# Patient Record
Sex: FEMALE | Race: WHITE | Employment: FULL TIME | URBAN - METROPOLITAN AREA
[De-identification: names, ages, dates, MRNs, and addresses within clinical notes are randomized per-mention and may not be internally consistent; named-entity substitution may affect disease eponyms.]

---
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
## 2021-02-11 ENCOUNTER — VIRTUAL VISIT (OUTPATIENT)
Dept: FAMILY MEDICINE CLINIC | Age: 21
End: 2021-02-11
Payer: COMMERCIAL

## 2021-02-11 DIAGNOSIS — K60.2 ANAL FISSURE: Primary | ICD-10-CM

## 2021-02-11 DIAGNOSIS — R10.31 RIGHT LOWER QUADRANT ABDOMINAL PAIN: ICD-10-CM

## 2021-02-11 PROCEDURE — 99422 OL DIG E/M SVC 11-20 MIN: CPT | Performed by: STUDENT IN AN ORGANIZED HEALTH CARE EDUCATION/TRAINING PROGRAM

## 2021-02-11 RX ORDER — DOCUSATE SODIUM 100 MG/1
100 CAPSULE, LIQUID FILLED ORAL 2 TIMES DAILY PRN
Qty: 60 CAPSULE | Refills: 0 | Status: SHIPPED | OUTPATIENT
Start: 2021-02-11 | End: 2021-03-12 | Stop reason: ALTCHOICE

## 2021-02-11 ASSESSMENT — ENCOUNTER SYMPTOMS
SINUS PRESSURE: 0
ANAL BLEEDING: 1
BLOOD IN STOOL: 0
NAUSEA: 1
SORE THROAT: 0
COUGH: 0
DIARRHEA: 0
ABDOMINAL PAIN: 0
SHORTNESS OF BREATH: 0
CONSTIPATION: 1
RECTAL PAIN: 1
VOMITING: 0

## 2021-02-11 ASSESSMENT — PATIENT HEALTH QUESTIONNAIRE - PHQ9
1. LITTLE INTEREST OR PLEASURE IN DOING THINGS: 0
SUM OF ALL RESPONSES TO PHQ QUESTIONS 1-9: 0

## 2021-02-11 NOTE — PATIENT INSTRUCTIONS
Patient Education     Anal Fissure: Care Instructions  Your Care Instructions  An anal fissure is a tear in the lining of the lower rectum (anus). It can itch and cause pain. You may notice bright red blood on toilet paper after you wipe. A fissure may form if you are constipated and try to pass a large, hard stool or if you do not relax your anal muscles during a bowel movement. Most anal fissures heal with home treatment after a few days or weeks. If you have an anal fissure that takes more time to heal, your doctor may prescribe medicine. In rare cases, surgery may be needed. Anal fissures do not lead to colon cancer or other serious illnesses. However, if you have blood mixed in with the stool, talk to your doctor. Follow-up care is a key part of your treatment and safety. Be sure to make and go to all appointments, and call your doctor if you are having problems. It's also a good idea to know your test results and keep a list of the medicines you take. How can you care for yourself at home? · If your doctor prescribed cream or ointment, use it exactly as prescribed. Call your doctor if you think you are having a problem with your medicine. You will get more details on the specific medicines your doctor prescribes. · Sit in a few inches of warm water (sitz bath) 3 times a day and after bowel movements. The warm water helps the area heal and eases discomfort. Do not put soaps, salts, or shampoos in the water. · Avoid constipation:  ? Include fruits, vegetables, beans, and whole grains in your diet each day. These foods are high in fiber. ? Drink plenty of fluids, enough so that your urine is light yellow or clear like water. If you have kidney, heart, or liver disease and have to limit fluids, talk with your doctor before you increase the amount of fluids you drink. ? Get some exercise every day. Build up slowly to 30 to 60 minutes a day on 5 or more days of the week. ? Take a fiber supplement, such as Benefiber, Citrucel, or Metamucil, every day if needed. Read and follow all instructions on the label. ? Use the toilet when you feel the urge. Or when you can, schedule time each day for a bowel movement. A daily routine may help. Take your time and do not strain when having a bowel movement. But do not sit on the toilet too long. · Support your feet with a small step stool when you sit on the toilet. This helps flex your hips and places your pelvis in a squatting position. · Your doctor may recommend an over-the-counter laxative, such as Miralax, Milk of Magnesia, or Ex-Lax. Read and follow all instructions on the label, and do not use these medicines on a long-term basis. · Do not use over-the-counter ointments or creams without talking to your doctor. Some of these preparations may not help. · Use baby wipes or medicated pads, such as Preparation H or Tucks, instead of toilet paper to clean after a bowel movement. These products do not irritate the anus. · Be safe with medicines. Read and follow all instructions on the label. ? If the doctor gave you a prescription medicine for pain, take it as prescribed. ? If you are not taking a prescription pain medicine, ask your doctor if you can take an over-the-counter medicine. When should you call for help? Call your doctor now or seek immediate medical care if:    · You have new or worse pain.     · You have new or worse bleeding from the rectum. Watch closely for changes in your health, and be sure to contact your doctor if:    · You have trouble passing stools.     · You do not get better as expected. Where can you learn more? Go to https://Piethis.comjeff.[email or web address] org and sign in to your [employer] account. Enter M425 in the [employer] box to learn more about \"Anal Fissure: Care Instructions. \"     If you do not have an account, please click on the \"Sign Up Now\" link.

## 2021-02-11 NOTE — PROGRESS NOTES
This visit began at 8:15am     Location of the visit was the [facility] primary care site. TELEHEALTH APPOINTMENT  Patient has been screened to determine that this visit qualifies for a \"Telephone Visit\". Patient is currently established with the current medical practice, the condition being reviewed was not addressed within the previous 7 days and is not likely be determined to need a procedure within the next 24 hours. This visit was via telephone due to the restrictions of the COVID-19 pandemic. All issues as below were discussed and addressed but no physical exam was performed. It was felt the patient should be evaluated in the clinic there will be comment below demonstrating they were directed there. The patient is aware and has given verbal consent to be billed for this telephone encounter.     Patient Name: Emmanuel Pena  MRN: [id number]    Chief Complaint   Patient presents with    Abdominal Pain     x2 days     Rectal Bleeding     HPI  Abdominal pain  Started 2 days ago  Similar to prior episodes  Pain located lower abdomen bilaterally  Constant  Nothing that seems to make it better or worse  Yesterday noticed blood in the stool and when she wiped  She has never had bloody stools before  She did have some pain with BM yesterday and today  Off and on constipation, no history of hemorrhoids  Blood was bight red in color  Endorses mild nausea, denied emesis  No specific foods that make her abdominal pain worse    Per records - abdominal pain is a chronic issue, has seen peds GI in the past  Started on prilosec 8/2020 with some improvement in symptoms at that time  Per Eliza's note, consider CT abd/pelvis if no improvement after 2 weeks of prilosec     PMH:  Current Outpatient Medications on File Prior to Visit   Medication Sig Dispense Refill    venlafaxine (EFFEXOR XR) 75 MG extended release capsule TAKE ONE CAPSULE BY MOUTH DAILY 30 capsule 5  drospirenone-ethinyl estradiol (NICHOL) 3-0.02 MG per tablet TAKE ONE TABLET BY MOUTH EVERY DAY 28 tablet 11    omeprazole (PRILOSEC) 20 MG delayed release capsule Take 1 capsule by mouth every morning (before breakfast) 30 capsule 1    ondansetron (ZOFRAN-ODT) 4 MG disintegrating tablet Take 1 tablet by mouth 3 times daily as needed for Nausea or Vomiting 21 tablet 0     No current facility-administered medications on file prior to visit.       Past Medical History:   Diagnosis Date    Anxiety     Gastritis      Past Surgical History:   Procedure Laterality Date    UPPER GASTROINTESTINAL ENDOSCOPY  2018     Social History     Socioeconomic History    Marital status: Single     Spouse name: Not on file    Number of children: Not on file    Years of education: Not on file    Highest education level: Not on file   Occupational History    Not on file   Social Needs    Financial resource strain: Not hard at all    Food insecurity     Worry: Never true     Inability: Never true   [employer] needs     Medical: No     Non-medical: No   Tobacco Use    Smoking status: Never Smoker    Smokeless tobacco: Never Used   Substance and Sexual Activity    Alcohol use: No    Drug use: No    Sexual activity: Never   Lifestyle    Physical activity     Days per week: Not on file     Minutes per session: Not on file    Stress: Not on file   Relationships    Social connections     Talks on phone: Not on file     Gets together: Not on file     Attends Shinto service: Not on file     Active member of club or organization: Not on file     Attends meetings of clubs or organizations: Not on file     Relationship status: Not on file    Intimate partner violence     Fear of current or ex partner: Not on file     Emotionally abused: Not on file     Physically abused: Not on file     Forced sexual activity: Not on file   Other Topics Concern    Not on file   Social History Narrative    Not on file An anal fissure is a tear in the lining of the lower rectum (anus). It can itch and cause pain. You may notice bright red blood on toilet paper after you wipe. A fissure may form if you are constipated and try to pass a large, hard stool or if you do not relax your anal muscles during a bowel movement. Most anal fissures heal with home treatment after a few days or weeks. If you have an anal fissure that takes more time to heal, your doctor may prescribe medicine. In rare cases, surgery may be needed. Anal fissures do not lead to colon cancer or other serious illnesses. However, if you have blood mixed in with the stool, talk to your doctor. Follow-up care is a key part of your treatment and safety. Be sure to make and go to all appointments, and call your doctor if you are having problems. It's also a good idea to know your test results and keep a list of the medicines you take. How can you care for yourself at home? · If your doctor prescribed cream or ointment, use it exactly as prescribed. Call your doctor if you think you are having a problem with your medicine. You will get more details on the specific medicines your doctor prescribes. · Sit in a few inches of warm water (sitz bath) 3 times a day and after bowel movements. The warm water helps the area heal and eases discomfort. Do not put soaps, salts, or shampoos in the water. · Avoid constipation:  ? Include fruits, vegetables, beans, and whole grains in your diet each day. These foods are high in fiber. ? Drink plenty of fluids, enough so that your urine is light yellow or clear like water. If you have kidney, heart, or liver disease and have to limit fluids, talk with your doctor before you increase the amount of fluids you drink. ? Get some exercise every day. Build up slowly to 30 to 60 minutes a day on 5 or more days of the week. ? Take a fiber supplement, such as Benefiber, Citrucel, or Metamucil, every day if needed. Read and follow all instructions on the label. ? Use the toilet when you feel the urge. Or when you can, schedule time each day for a bowel movement. A daily routine may help. Take your time and do not strain when having a bowel movement. But do not sit on the toilet too long. · Support your feet with a small step stool when you sit on the toilet. This helps flex your hips and places your pelvis in a squatting position. · Your doctor may recommend an over-the-counter laxative, such as Miralax, Milk of Magnesia, or Ex-Lax. Read and follow all instructions on the label, and do not use these medicines on a long-term basis. · Do not use over-the-counter ointments or creams without talking to your doctor. Some of these preparations may not help. · Use baby wipes or medicated pads, such as Preparation H or Tucks, instead of toilet paper to clean after a bowel movement. These products do not irritate the anus. · Be safe with medicines. Read and follow all instructions on the label. ? If the doctor gave you a prescription medicine for pain, take it as prescribed. ? If you are not taking a prescription pain medicine, ask your doctor if you can take an over-the-counter medicine. When should you call for help? Call your doctor now or seek immediate medical care if:    · You have new or worse pain.     · You have new or worse bleeding from the rectum. Watch closely for changes in your health, and be sure to contact your doctor if:    · You have trouble passing stools.     · You do not get better as expected. Where can you learn more? Go to [email or web address]://Koubachijeff.[email or web address]. org and sign in to your [employer] account. Enter V441 in the [employer] box to learn more about \"Anal Fissure: Care Instructions. \"     If you do not have an account, please click on the \"Sign Up Now\" link. Current as of: April 15, 2020               Content Version: 12.6  © 7614-7866 [email or web address]. Care instructions adapted under license by [facility] [place] ([facility]). If you have questions about a medical condition or this instruction, always ask your healthcare professional. [address] any warranty or liability for your use of this information.              This visit ended at 8:26am    Kate Dalal, DO

## 2021-03-12 ENCOUNTER — OFFICE VISIT (OUTPATIENT)
Dept: FAMILY MEDICINE CLINIC | Age: 21
End: 2021-03-12
Payer: COMMERCIAL

## 2021-03-12 VITALS
DIASTOLIC BLOOD PRESSURE: 82 MMHG | HEART RATE: 83 BPM | HEIGHT: 64 IN | WEIGHT: 130.6 LBS | OXYGEN SATURATION: 99 % | BODY MASS INDEX: 22.3 KG/M2 | TEMPERATURE: 98.1 F | SYSTOLIC BLOOD PRESSURE: 116 MMHG

## 2021-03-12 DIAGNOSIS — F51.05 INSOMNIA DUE TO OTHER MENTAL DISORDER: ICD-10-CM

## 2021-03-12 DIAGNOSIS — F43.21 GRIEF REACTION: ICD-10-CM

## 2021-03-12 DIAGNOSIS — F99 INSOMNIA DUE TO OTHER MENTAL DISORDER: ICD-10-CM

## 2021-03-12 DIAGNOSIS — F41.1 GENERALIZED ANXIETY DISORDER: Primary | ICD-10-CM

## 2021-03-12 PROCEDURE — G8420 CALC BMI NORM PARAMETERS: HCPCS | Performed by: NURSE PRACTITIONER

## 2021-03-12 PROCEDURE — G8427 DOCREV CUR MEDS BY ELIG CLIN: HCPCS | Performed by: NURSE PRACTITIONER

## 2021-03-12 PROCEDURE — 99214 OFFICE O/P EST MOD 30 MIN: CPT | Performed by: NURSE PRACTITIONER

## 2021-03-12 PROCEDURE — G8482 FLU IMMUNIZE ORDER/ADMIN: HCPCS | Performed by: NURSE PRACTITIONER

## 2021-03-12 PROCEDURE — 1036F TOBACCO NON-USER: CPT | Performed by: NURSE PRACTITIONER

## 2021-03-12 RX ORDER — BUSPIRONE HYDROCHLORIDE 5 MG/1
5 TABLET ORAL 2 TIMES DAILY
Qty: 60 TABLET | Refills: 0 | Status: SHIPPED | OUTPATIENT
Start: 2021-03-12 | End: 2021-04-11

## 2021-03-12 ASSESSMENT — ENCOUNTER SYMPTOMS
CHEST TIGHTNESS: 0
COUGH: 0
PHOTOPHOBIA: 0

## 2021-03-12 NOTE — PROGRESS NOTES
Subjective  Chief Complaint   Patient presents with    Discuss Medications     states that she believes that her effexor has stopped working. HPI    Pt here to discuss anxiety. Taking effexor 75 mg daily, managing her depression very well; however feels her anxiety has not been well controlled. Also struggling with insomnia related to her anxiety. Feels her mind just will not slow down to allow her to sleep. Has a counselor in [facility] but has not been following consistently with her. Of note, she just found her very good friend  from an overdose last night. Is very tearful regarding this during our visit today. Is going through the grief process with friends and family. Past Medical History:   Diagnosis Date    Anxiety     Gastritis      There are no active problems to display for this patient. Past Surgical History:   Procedure Laterality Date    UPPER GASTROINTESTINAL ENDOSCOPY       No family history on file.   Social History     Socioeconomic History    Marital status: Single     Spouse name: None    Number of children: None    Years of education: None    Highest education level: None   Occupational History    None   Social Needs    Financial resource strain: Not hard at all   Rm-Shari insecurity     Worry: Never true     Inability: Never true    Transportation needs     Medical: No     Non-medical: No   Tobacco Use    Smoking status: Never Smoker    Smokeless tobacco: Never Used   Substance and Sexual Activity    Alcohol use: No    Drug use: No    Sexual activity: Never   Lifestyle    Physical activity     Days per week: None     Minutes per session: None    Stress: None   Relationships    Social connections     Talks on phone: None     Gets together: None     Attends Spiritism service: None     Active member of club or organization: None     Attends meetings of clubs or organizations: None     Relationship status: None    Intimate partner violence     Fear of current or ex partner: None     Emotionally abused: None     Physically abused: None     Forced sexual activity: None   Other Topics Concern    None   Social History Narrative    None     Current Outpatient Medications on File Prior to Visit   Medication Sig Dispense Refill    venlafaxine (EFFEXOR XR) 75 MG extended release capsule TAKE ONE CAPSULE BY MOUTH DAILY 30 capsule 5    drospirenone-ethinyl estradiol (NICHOL) 3-0.02 MG per tablet TAKE ONE TABLET BY MOUTH EVERY DAY 28 tablet 11     No current facility-administered medications on file prior to visit. No Known Allergies    Review of Systems   Constitutional: Negative for chills, diaphoresis, fatigue and fever. HENT: Negative for congestion and ear pain. Eyes: Negative for photophobia and visual disturbance. Respiratory: Negative for cough and chest tightness. Cardiovascular: Negative for chest pain, palpitations and leg swelling. Endocrine: Negative for polydipsia, polyphagia and polyuria. Genitourinary: Negative for difficulty urinating and dysuria. Neurological: Negative for dizziness and headaches. Psychiatric/Behavioral: Positive for sleep disturbance. Negative for behavioral problems, dysphoric mood, self-injury and suicidal ideas. The patient is nervous/anxious. Objective  Vitals:    03/12/21 1309   BP: 116/82   Site: Right Upper Arm   Position: Sitting   Cuff Size: Medium Adult   Pulse: 83   Temp: 98.1 °F (36.7 °C)   TempSrc: Infrared   SpO2: 99%   Weight: 130 lb 9.6 oz (59.2 kg)   Height: 5' 4\" (1.626 m)     Physical Exam  Vitals signs reviewed. Constitutional:       General: She is not in acute distress. Appearance: Normal appearance. She is well-developed and normal weight. She is not ill-appearing, toxic-appearing or diaphoretic. HENT:      Head: Normocephalic and atraumatic. Right Ear: Hearing and external ear normal. There is no impacted cerumen.       Left Ear: Hearing and external ear normal. There is no impacted cerumen. Nose: Nose normal.   Eyes:      General:         Right eye: No discharge. Left eye: No discharge. Conjunctiva/sclera: Conjunctivae normal.      Pupils: Pupils are equal, round, and reactive to light. Neck:      Musculoskeletal: Neck supple. No muscular tenderness. Thyroid: No thyromegaly. Cardiovascular:      Rate and Rhythm: Normal rate and regular rhythm. Heart sounds: Normal heart sounds. No murmur. Pulmonary:      Effort: Pulmonary effort is normal. No respiratory distress. Breath sounds: Normal breath sounds. No wheezing or rales. Musculoskeletal: Normal range of motion. Right lower leg: No edema. Left lower leg: No edema. Lymphadenopathy:      Cervical: No cervical adenopathy. Skin:     General: Skin is warm and dry. Capillary Refill: Capillary refill takes less than 2 seconds. Coloration: Skin is not jaundiced or pale. Findings: No bruising, erythema, lesion or rash. Neurological:      General: No focal deficit present. Mental Status: She is alert and oriented to person, place, and time. Mental status is at baseline. Cranial Nerves: No cranial nerve deficit. Coordination: Coordination normal.      Gait: Gait normal.   Psychiatric:         Mood and Affect: Mood normal.         Speech: Speech normal.         Behavior: Behavior normal.         Thought Content: Thought content normal.         Judgment: Judgment normal.         Assessment& Plan     Diagnosis Orders   1. Generalized anxiety disorder  busPIRone (BUSPAR) 5 MG tablet   2. Insomnia due to other mental disorder     3. Grief reaction       Add on buspar to current regimen. Pt advised to call and schedule appt with counseling. Discussed meditation and relaxation techniques and apps to assist with anxiety and insomnia. F/u in 2 weeks or sooner PRN.   Side effects, adverse effects of the medication prescribed today, as well as treatment plan/ rationale and result expectations have been discussed with the patient who expresses understanding and desires to proceed. Close follow up to evaluate treatment results and for coordination of care. I have reviewed the patient's medical history in detail and updated the computerized patient record. As always, patient is advised that if symptoms worsen in any way they will proceed to the nearest emergency room. No orders of the defined types were placed in this encounter. Orders Placed This Encounter   Medications    busPIRone (BUSPAR) 5 MG tablet     Sig: Take 1 tablet by mouth 2 times daily     Dispense:  60 tablet     Refill:  0       Return in about 2 weeks (around 3/26/2021) for anxiety, insomnia.     Rocio Ocasio, DOMONIQUE - CNP

## 2021-03-12 NOTE — PATIENT INSTRUCTIONS
Calm denilson  2. Dreamy kid (some free parts)   This denilson plays relaxing sounds and has 2 free guided meditations including a body scan and a meditation for self esteem. The denilson also has other meditations that are not free. 4. Mindshift (free)  This denilson has information on anxiety, a symptom monitor, skills for relaxation and stress management, thinking strategies to help with anxiety, and tips on ideas to try to decrease anxiety.

## 2021-03-26 ENCOUNTER — OFFICE VISIT (OUTPATIENT)
Dept: FAMILY MEDICINE CLINIC | Age: 21
End: 2021-03-26
Payer: COMMERCIAL

## 2021-03-26 VITALS
DIASTOLIC BLOOD PRESSURE: 72 MMHG | HEART RATE: 71 BPM | TEMPERATURE: 98.2 F | SYSTOLIC BLOOD PRESSURE: 104 MMHG | OXYGEN SATURATION: 98 % | WEIGHT: 132 LBS | BODY MASS INDEX: 22.53 KG/M2 | HEIGHT: 64 IN

## 2021-03-26 DIAGNOSIS — F41.1 GENERALIZED ANXIETY DISORDER: Primary | ICD-10-CM

## 2021-03-26 PROCEDURE — G8420 CALC BMI NORM PARAMETERS: HCPCS | Performed by: NURSE PRACTITIONER

## 2021-03-26 PROCEDURE — G8482 FLU IMMUNIZE ORDER/ADMIN: HCPCS | Performed by: NURSE PRACTITIONER

## 2021-03-26 PROCEDURE — G8427 DOCREV CUR MEDS BY ELIG CLIN: HCPCS | Performed by: NURSE PRACTITIONER

## 2021-03-26 PROCEDURE — 99213 OFFICE O/P EST LOW 20 MIN: CPT | Performed by: NURSE PRACTITIONER

## 2021-03-26 PROCEDURE — 1036F TOBACCO NON-USER: CPT | Performed by: NURSE PRACTITIONER

## 2021-03-26 ASSESSMENT — ENCOUNTER SYMPTOMS
SHORTNESS OF BREATH: 0
DIARRHEA: 0
EYE PAIN: 0
COUGH: 0
TROUBLE SWALLOWING: 0
CHEST TIGHTNESS: 0
CONSTIPATION: 0
ABDOMINAL PAIN: 0

## 2021-03-26 NOTE — PROGRESS NOTES
Subjective  Chief Complaint   Patient presents with    Anxiety     2 week f/u after starting buspar, states that she thinks it is helping some. HPI    Pt here for follow up on anxiety. Taking buspar for 2 weeks now, feels it is helping with her anxiety. Still having grief related to the loss of her friend, but is following with counseling and feels she is managing it well. Past Medical History:   Diagnosis Date    Anxiety     Gastritis      There are no active problems to display for this patient. Past Surgical History:   Procedure Laterality Date    UPPER GASTROINTESTINAL ENDOSCOPY  2018     No family history on file.   Social History     Socioeconomic History    Marital status: Single     Spouse name: None    Number of children: None    Years of education: None    Highest education level: None   Occupational History    None   Social Needs    Financial resource strain: Not hard at all   Rm-Shari insecurity     Worry: Never true     Inability: Never true    Transportation needs     Medical: No     Non-medical: No   Tobacco Use    Smoking status: Never Smoker    Smokeless tobacco: Never Used   Substance and Sexual Activity    Alcohol use: No    Drug use: No    Sexual activity: Never   Lifestyle    Physical activity     Days per week: None     Minutes per session: None    Stress: None   Relationships    Social connections     Talks on phone: None     Gets together: None     Attends Church service: None     Active member of club or organization: None     Attends meetings of clubs or organizations: None     Relationship status: None    Intimate partner violence     Fear of current or ex partner: None     Emotionally abused: None     Physically abused: None     Forced sexual activity: None   Other Topics Concern    None   Social History Narrative    None     Current Outpatient Medications on File Prior to Visit   Medication Sig Dispense Refill    busPIRone (BUSPAR) 5 MG tablet Take 1 tablet by mouth 2 times daily 60 tablet 0    venlafaxine (EFFEXOR XR) 75 MG extended release capsule TAKE ONE CAPSULE BY MOUTH DAILY 30 capsule 5    drospirenone-ethinyl estradiol (NICHOL) 3-0.02 MG per tablet TAKE ONE TABLET BY MOUTH EVERY DAY 28 tablet 11     No current facility-administered medications on file prior to visit. No Known Allergies    Review of Systems   Constitutional: Negative for activity change, appetite change, chills, diaphoresis, fatigue and fever. HENT: Negative for ear pain, hearing loss and trouble swallowing. Eyes: Negative for pain and visual disturbance. Respiratory: Negative for cough, chest tightness and shortness of breath. Cardiovascular: Negative for chest pain, palpitations and leg swelling. Gastrointestinal: Negative for abdominal pain, constipation and diarrhea. Neurological: Negative for dizziness and light-headedness. Psychiatric/Behavioral: Positive for dysphoric mood. The patient is not nervous/anxious. Objective  Vitals:    03/26/21 0919   BP: 104/72   Site: Right Upper Arm   Position: Sitting   Cuff Size: Medium Adult   Pulse: 71   Temp: 98.2 °F (36.8 °C)   TempSrc: Infrared   SpO2: 98%   Weight: 132 lb (59.9 kg)   Height: 5' 4\" (1.626 m)     Physical Exam  Constitutional:       General: She is not in acute distress. Appearance: Normal appearance. She is normal weight. She is not ill-appearing, toxic-appearing or diaphoretic. HENT:      Head: Normocephalic and atraumatic. Right Ear: External ear normal.      Left Ear: External ear normal.   Neck:      Musculoskeletal: Normal range of motion and neck supple. No muscular tenderness. Cardiovascular:      Rate and Rhythm: Normal rate and regular rhythm. Pulses: Normal pulses. Heart sounds: Normal heart sounds. No murmur. Musculoskeletal: Normal range of motion. Right lower leg: No edema. Left lower leg: No edema.    Lymphadenopathy:      Cervical: No cervical adenopathy. Skin:     General: Skin is warm and dry. Capillary Refill: Capillary refill takes less than 2 seconds. Coloration: Skin is not jaundiced. Findings: No bruising, erythema, lesion or rash. Neurological:      General: No focal deficit present. Mental Status: She is alert and oriented to person, place, and time. Mental status is at baseline. Cranial Nerves: No cranial nerve deficit. Coordination: Coordination normal.      Gait: Gait normal.   Psychiatric:         Mood and Affect: Mood normal.         Behavior: Behavior normal.         Thought Content: Thought content normal.         Judgment: Judgment normal.         Assessment& Plan    1. Generalized anxiety disorder  Continue current regimen. Continue to follow with counseling. F/u in 4 weeks or sooner PRN. Side effects, adverse effects of the medication prescribed today, as well as treatment plan/ rationale and result expectations have been discussed with the patient who expresses understanding and desires to proceed. Close follow up to evaluate treatment results and for coordination of care. I have reviewed the patient's medical history in detail and updated the computerized patient record. As always, patient is advised that if symptoms worsen in any way they will proceed to the nearest emergency room. No orders of the defined types were placed in this encounter. No orders of the defined types were placed in this encounter. Return in about 4 weeks (around 4/23/2021) for depression/anxiety.     DOMONIQUE Leary - CNP

## 2021-04-29 ENCOUNTER — OFFICE VISIT (OUTPATIENT)
Dept: FAMILY MEDICINE CLINIC | Age: 21
End: 2021-04-29
Payer: COMMERCIAL

## 2021-04-29 VITALS
TEMPERATURE: 97.8 F | WEIGHT: 126 LBS | HEIGHT: 64 IN | DIASTOLIC BLOOD PRESSURE: 62 MMHG | OXYGEN SATURATION: 99 % | SYSTOLIC BLOOD PRESSURE: 94 MMHG | BODY MASS INDEX: 21.51 KG/M2 | HEART RATE: 83 BPM

## 2021-04-29 DIAGNOSIS — K29.00 ACUTE GASTRITIS WITHOUT HEMORRHAGE, UNSPECIFIED GASTRITIS TYPE: ICD-10-CM

## 2021-04-29 DIAGNOSIS — F41.8 DEPRESSION WITH ANXIETY: Primary | ICD-10-CM

## 2021-04-29 PROCEDURE — G8427 DOCREV CUR MEDS BY ELIG CLIN: HCPCS | Performed by: NURSE PRACTITIONER

## 2021-04-29 PROCEDURE — 99213 OFFICE O/P EST LOW 20 MIN: CPT | Performed by: NURSE PRACTITIONER

## 2021-04-29 PROCEDURE — 1036F TOBACCO NON-USER: CPT | Performed by: NURSE PRACTITIONER

## 2021-04-29 PROCEDURE — G8420 CALC BMI NORM PARAMETERS: HCPCS | Performed by: NURSE PRACTITIONER

## 2021-04-29 RX ORDER — BUSPIRONE HYDROCHLORIDE 10 MG/1
10 TABLET ORAL 3 TIMES DAILY
Qty: 90 TABLET | Refills: 0 | Status: SHIPPED | OUTPATIENT
Start: 2021-04-29 | End: 2021-05-29

## 2021-04-29 RX ORDER — VENLAFAXINE HYDROCHLORIDE 37.5 MG/1
37.5 CAPSULE, EXTENDED RELEASE ORAL DAILY
Qty: 30 CAPSULE | Refills: 1 | Status: SHIPPED | OUTPATIENT
Start: 2021-04-29 | End: 2022-04-01

## 2021-04-29 ASSESSMENT — ENCOUNTER SYMPTOMS
DIARRHEA: 0
SHORTNESS OF BREATH: 0
COLOR CHANGE: 0
COUGH: 0
CHEST TIGHTNESS: 0
ABDOMINAL PAIN: 0
EYE PAIN: 0
TROUBLE SWALLOWING: 0
CONSTIPATION: 0

## 2021-04-29 NOTE — PROGRESS NOTES
Subjective  Chief Complaint   Patient presents with    Anxiety     states anxiety is ok, still having some issues.  Immunizations     does not want to finish HPV series today       HPI    Here for anxiety. Has panic attacks a couple days a week. denies any triggers. Is taking effexor 75 mg daily at this time. Reports feeling depressed. Seeing a counselor every couple weeks. Denies thoughts of self harm or suicide. States she does not feel the effexor is working as well as it used to. Took the buspar 5 mg twice daily for anxiety but states she did not notice a difference. Gets about 8 hrs of sleep some nights and other nights has difficulty getting any sleep at all. Feels tired throughout the day. No change in appetite. Little interest in doing regular activities. Having difficulty doing school work and staying focus. Patient feels like she wants a new counselor, does not have anyone in mind but does not want to go to [place] counseling. Stomach is feeling okay. Denies nausea, vomiting, diarrhea. Past Medical History:   Diagnosis Date    Anxiety     Gastritis      There are no active problems to display for this patient. Past Surgical History:   Procedure Laterality Date    UPPER GASTROINTESTINAL ENDOSCOPY  2018     No family history on file.   Social History     Socioeconomic History    Marital status: Single     Spouse name: None    Number of children: None    Years of education: None    Highest education level: None   Occupational History    None   Social Needs    Financial resource strain: Not hard at all   Rm-Shari insecurity     Worry: Never true     Inability: Never true    Transportation needs     Medical: No     Non-medical: No   Tobacco Use    Smoking status: Never Smoker    Smokeless tobacco: Never Used   Substance and Sexual Activity    Alcohol use: No    Drug use: No    Sexual activity: Never   Lifestyle    Physical activity     Days per week: None Minutes per session: None    Stress: None   Relationships    Social connections     Talks on phone: None     Gets together: None     Attends [employer] service: None     Active member of club or organization: None     Attends meetings of clubs or organizations: None     Relationship status: None    Intimate partner violence     Fear of current or ex partner: None     Emotionally abused: None     Physically abused: None     Forced sexual activity: None   Other Topics Concern    None   Social History Narrative    None     Current Outpatient Medications on File Prior to Visit   Medication Sig Dispense Refill    venlafaxine (EFFEXOR XR) 75 MG extended release capsule TAKE ONE CAPSULE BY MOUTH DAILY 30 capsule 5    drospirenone-ethinyl estradiol (NICHOL) 3-0.02 MG per tablet TAKE ONE TABLET BY MOUTH EVERY DAY 28 tablet 11     No current facility-administered medications on file prior to visit. No Known Allergies    Review of Systems   Constitutional: Positive for fatigue. Negative for activity change and appetite change. HENT: Negative for ear pain, hearing loss and trouble swallowing. Eyes: Negative for pain and visual disturbance. Respiratory: Negative for cough, chest tightness and shortness of breath. Cardiovascular: Negative for chest pain, palpitations and leg swelling. Gastrointestinal: Negative for abdominal pain, constipation and diarrhea. Genitourinary: Negative for difficulty urinating. Musculoskeletal: Negative for arthralgias. Skin: Negative for color change, pallor, rash and wound. Neurological: Negative for dizziness and light-headedness. Psychiatric/Behavioral: Positive for dysphoric mood and sleep disturbance. The patient is nervous/anxious.         Objective  Vitals:    04/29/21 1346   BP: 94/62   Site: Left Upper Arm   Position: Sitting   Cuff Size: Medium Adult   Pulse: 83   Temp: 97.8 °F (36.6 °C)   TempSrc: Tympanic   SpO2: 99%   Weight: 126 lb (57.2 kg)   Height: 5' 4\"

## 2021-05-17 ENCOUNTER — TELEPHONE (OUTPATIENT)
Dept: BEHAVIORAL/MENTAL HEALTH CLINIC | Age: 21
End: 2021-05-17

## 2021-05-17 NOTE — TELEPHONE ENCOUNTER
[phone] Patient aware no new patient appointments available with Dr. Niya Posada. Patient given number to [facility] navigator line [phone] for assistance.

## 2021-06-14 ENCOUNTER — APPOINTMENT (OUTPATIENT)
Dept: CT IMAGING | Age: 21
End: 2021-06-14
Payer: COMMERCIAL

## 2021-06-14 ENCOUNTER — HOSPITAL ENCOUNTER (EMERGENCY)
Age: 21
Discharge: HOME | End: 2021-06-14
Payer: COMMERCIAL

## 2021-06-14 VITALS
BODY MASS INDEX: 23.05 KG/M2 | OXYGEN SATURATION: 99 % | HEIGHT: 64 IN | WEIGHT: 135 LBS | SYSTOLIC BLOOD PRESSURE: 125 MMHG | TEMPERATURE: 98.4 F | RESPIRATION RATE: 17 BRPM | DIASTOLIC BLOOD PRESSURE: 78 MMHG | HEART RATE: 79 BPM

## 2021-06-14 DIAGNOSIS — E86.0 DEHYDRATION: Primary | ICD-10-CM

## 2021-06-14 LAB
ALBUMIN SERPL-MCNC: 4.7 G/DL (ref 3.5–4.6)
ALP BLD-CCNC: 46 U/L (ref 40–130)
ALT SERPL-CCNC: 17 U/L (ref 0–33)
ANION GAP SERPL CALCULATED.3IONS-SCNC: 12 MEQ/L (ref 9–15)
AST SERPL-CCNC: 17 U/L (ref 0–35)
BASOPHILS ABSOLUTE: 0 K/UL (ref 0–0.2)
BASOPHILS RELATIVE PERCENT: 0.3 %
BILIRUB SERPL-MCNC: 0.5 MG/DL (ref 0.2–0.7)
BILIRUBIN URINE: NEGATIVE
BLOOD, URINE: NEGATIVE
BUN BLDV-MCNC: 9 MG/DL (ref 6–20)
CALCIUM SERPL-MCNC: 10.1 MG/DL (ref 8.5–9.9)
CHLORIDE BLD-SCNC: 102 MEQ/L (ref 95–107)
CLARITY: CLEAR
CO2: 24 MEQ/L (ref 20–31)
COLOR: YELLOW
CREAT SERPL-MCNC: 0.51 MG/DL (ref 0.5–0.9)
EOSINOPHILS ABSOLUTE: 0.1 K/UL (ref 0–0.7)
EOSINOPHILS RELATIVE PERCENT: 0.8 %
GFR AFRICAN AMERICAN: >60
GFR AFRICAN AMERICAN: >60
GFR NON-AFRICAN AMERICAN: >60
GFR NON-AFRICAN AMERICAN: >60
GLOBULIN: 3.3 G/DL (ref 2.3–3.5)
GLUCOSE BLD-MCNC: 86 MG/DL (ref 70–99)
GLUCOSE URINE: NEGATIVE MG/DL
HCT VFR BLD CALC: 41.5 % (ref 37–47)
HEMOGLOBIN: 14.3 G/DL (ref 12–16)
KETONES, URINE: >=80 MG/DL
LACTIC ACID: 1.7 MMOL/L (ref 0.5–2.2)
LEUKOCYTE ESTERASE, URINE: NEGATIVE
LIPASE: 34 U/L (ref 12–95)
LYMPHOCYTES ABSOLUTE: 2.4 K/UL (ref 1–4.8)
LYMPHOCYTES RELATIVE PERCENT: 30.7 %
MCH RBC QN AUTO: 29.6 PG (ref 27–31.3)
MCHC RBC AUTO-ENTMCNC: 34.6 % (ref 33–37)
MCV RBC AUTO: 85.7 FL (ref 82–100)
MONOCYTES ABSOLUTE: 0.7 K/UL (ref 0.2–0.8)
MONOCYTES RELATIVE PERCENT: 8.5 %
NEUTROPHILS ABSOLUTE: 4.7 K/UL (ref 1.4–6.5)
NEUTROPHILS RELATIVE PERCENT: 59.7 %
NITRITE, URINE: NEGATIVE
PDW BLD-RTO: 12.5 % (ref 11.5–14.5)
PERFORMED ON: ABNORMAL
PH UA: 7 (ref 5–9)
PLATELET # BLD: 313 K/UL (ref 130–400)
POC CREATININE: 0.5 MG/DL (ref 0.6–1.1)
POC SAMPLE TYPE: ABNORMAL
POTASSIUM SERPL-SCNC: 3.5 MEQ/L (ref 3.4–4.9)
PROTEIN UA: ABNORMAL MG/DL
RBC # BLD: 4.84 M/UL (ref 4.2–5.4)
SODIUM BLD-SCNC: 138 MEQ/L (ref 135–144)
SPECIFIC GRAVITY UA: 1.02 (ref 1–1.03)
TOTAL PROTEIN: 8 G/DL (ref 6.3–8)
URINE REFLEX TO CULTURE: ABNORMAL
UROBILINOGEN, URINE: 1 E.U./DL
WBC # BLD: 7.9 K/UL (ref 4.5–11)

## 2021-06-14 PROCEDURE — 85025 COMPLETE CBC W/AUTO DIFF WBC: CPT

## 2021-06-14 PROCEDURE — 36415 COLL VENOUS BLD VENIPUNCTURE: CPT

## 2021-06-14 PROCEDURE — 80053 COMPREHEN METABOLIC PANEL: CPT

## 2021-06-14 PROCEDURE — 99283 EMERGENCY DEPT VISIT LOW MDM: CPT

## 2021-06-14 PROCEDURE — 2500000003 HC RX 250 WO HCPCS: Performed by: PHYSICIAN ASSISTANT

## 2021-06-14 PROCEDURE — 96375 TX/PRO/DX INJ NEW DRUG ADDON: CPT

## 2021-06-14 PROCEDURE — 2580000003 HC RX 258: Performed by: PHYSICIAN ASSISTANT

## 2021-06-14 PROCEDURE — 81003 URINALYSIS AUTO W/O SCOPE: CPT

## 2021-06-14 PROCEDURE — 83605 ASSAY OF LACTIC ACID: CPT

## 2021-06-14 PROCEDURE — 6360000002 HC RX W HCPCS: Performed by: PHYSICIAN ASSISTANT

## 2021-06-14 PROCEDURE — 96374 THER/PROPH/DIAG INJ IV PUSH: CPT

## 2021-06-14 PROCEDURE — 83690 ASSAY OF LIPASE: CPT

## 2021-06-14 RX ORDER — KETOROLAC TROMETHAMINE 30 MG/ML
30 INJECTION, SOLUTION INTRAMUSCULAR; INTRAVENOUS ONCE
Status: COMPLETED | OUTPATIENT
Start: 2021-06-14 | End: 2021-06-14

## 2021-06-14 RX ORDER — ONDANSETRON 4 MG/1
4 TABLET, ORALLY DISINTEGRATING ORAL 3 TIMES DAILY PRN
Qty: 10 TABLET | Refills: 0 | Status: SHIPPED | OUTPATIENT
Start: 2021-06-14 | End: 2021-06-17 | Stop reason: SDUPTHER

## 2021-06-14 RX ORDER — 0.9 % SODIUM CHLORIDE 0.9 %
2000 INTRAVENOUS SOLUTION INTRAVENOUS ONCE
Status: COMPLETED | OUTPATIENT
Start: 2021-06-14 | End: 2021-06-14

## 2021-06-14 RX ORDER — ONDANSETRON 2 MG/ML
4 INJECTION INTRAMUSCULAR; INTRAVENOUS ONCE
Status: COMPLETED | OUTPATIENT
Start: 2021-06-14 | End: 2021-06-14

## 2021-06-14 RX ADMIN — KETOROLAC TROMETHAMINE 30 MG: 30 INJECTION, SOLUTION INTRAMUSCULAR; INTRAVENOUS at 01:47

## 2021-06-14 RX ADMIN — FAMOTIDINE 20 MG: 10 INJECTION, SOLUTION INTRAVENOUS at 01:48

## 2021-06-14 RX ADMIN — ONDANSETRON 4 MG: 2 INJECTION INTRAMUSCULAR; INTRAVENOUS at 01:47

## 2021-06-14 RX ADMIN — SODIUM CHLORIDE 2000 ML: 9 INJECTION, SOLUTION INTRAVENOUS at 01:47

## 2021-06-14 ASSESSMENT — PAIN SCALES - GENERAL
PAINLEVEL_OUTOF10: 7
PAINLEVEL_OUTOF10: 7

## 2021-06-14 ASSESSMENT — ENCOUNTER SYMPTOMS
ABDOMINAL PAIN: 1
DIARRHEA: 0
ALLERGIC/IMMUNOLOGIC NEGATIVE: 1
SHORTNESS OF BREATH: 0
APNEA: 0
VOMITING: 1
EYE PAIN: 0
NAUSEA: 1
TROUBLE SWALLOWING: 0
COLOR CHANGE: 0

## 2021-06-14 ASSESSMENT — PAIN DESCRIPTION - PAIN TYPE: TYPE: ACUTE PAIN

## 2021-06-14 ASSESSMENT — PAIN DESCRIPTION - LOCATION: LOCATION: ABDOMEN

## 2021-06-14 NOTE — ED TRIAGE NOTES
Arrived to the ER for c/o abdominal pain. States began having abdominal pain Saturday. Reports was on vacation in [place] and began to get sick so thought was related to the heat/ humidity. That night was unable to keep any fluids down without vomiting. Drove home today and then developed abdominal pain. States pain is located across the lower abdomen. Denies diarrhea, fever/ chills. States unable to void.

## 2021-06-14 NOTE — ED PROVIDER NOTES
Services:     Active Member of Clubs or Organizations:     Attends Club or Organization Meetings:     Marital Status:    Intimate Partner Violence:     Fear of Current or Ex-Partner:     Emotionally Abused:     Physically Abused:     Sexually Abused:        SCREENINGS           PHYSICAL EXAM    (up to 7 forlevel 4, 8 or more for level 5)     ED Triage Vitals [06/14/21 0019]   BP Temp Temp Source Pulse Resp SpO2 Height Weight   118/82 98.4 °F (36.9 °C) Oral 92 16 96 % 5' 4\" (1.626 m) 135 lb (61.2 kg)       Physical Exam  Vitals and nursing note reviewed. Constitutional:       General: She is not in acute distress. Appearance: She is well-developed. She is not diaphoretic. HENT:      Head: Normocephalic and atraumatic. Mouth/Throat:      Pharynx: No oropharyngeal exudate. Eyes:      General: No scleral icterus. Conjunctiva/sclera: Conjunctivae normal.      Pupils: Pupils are equal, round, and reactive to light. Neck:      Trachea: No tracheal deviation. Cardiovascular:      Rate and Rhythm: Normal rate. Heart sounds: Normal heart sounds. Pulmonary:      Effort: Pulmonary effort is normal. No respiratory distress. Breath sounds: Normal breath sounds. Abdominal:      General: Bowel sounds are normal. There is no distension. Palpations: Abdomen is soft. Tenderness: There is abdominal tenderness in the right lower quadrant, suprapubic area and left lower quadrant. There is right CVA tenderness and left CVA tenderness. Musculoskeletal:         General: Normal range of motion. Cervical back: Normal range of motion and neck supple. Skin:     General: Skin is warm and dry. Findings: No erythema or rash. Neurological:      Mental Status: She is alert and oriented to person, place, and time. Cranial Nerves: No cranial nerve deficit. Motor: No abnormal muscle tone. Psychiatric:         Behavior: Behavior normal.         Thought Content:  Thought content normal.         Judgment: Judgment normal.           DIAGNOSTIC RESULTS     RADIOLOGY:   Non-plain film images such as CT, Ultrasound and MRI are read by the radiologist. Plain radiographic images are visualized and preliminarilyinterpreted by Jamie Colindres MD with the below findings:    Interpretation per the Radiologist below, if available at the time of this note:    No orders to display       LABS:  [address] - Abnormal; Notable for the following components:       Result Value    Calcium 10.1 (*)     Albumin 4.7 (*)     All other components within normal limits   URINE RT REFLEX TO CULTURE - Abnormal; Notable for the following components:    Ketones, Urine >=80 (*)     Protein, UA TRACE (*)     All other components within normal limits   CBC WITH AUTO DIFFERENTIAL   LACTIC ACID, PLASMA   LIPASE   POC PREGNANCY UR-QUAL       All other labs were within normal range or not returnedas of this dictation. EMERGENCYDEPARTMENT COURSE and DIFFERENTIAL DIAGNOSIS/MDM:   Vitals:    Vitals:    06/14/21 0019 06/14/21 0200 06/14/21 0244   BP: 118/82  (!) 127/96   Pulse: 92  80   Resp: 16     Temp: 98.4 °F (36.9 °C)     TempSrc: Oral     SpO2: 96% 98% 98%   Weight: 135 lb (61.2 kg)     Height: 5' 4\" (1.626 m)         REASSESSMENT          MDM  Number of Diagnoses or Management Options  Dehydration  Diagnosis management comments: Feeling well on my eval.  Abdomen soft and nontender. Given 2L fluids and tolerating fluids prior to discharge. Patient will be discharged home in good condition. Patient has been hemodynamically stable throughout ED course and is appropriate for outpatient follow up. Patient should follow up with PCP in 2-3 days or return to ED immediately for any new or worsening symptoms. Patient is well appearing on discharge and mom agreeable with plan of care. PROCEDURES:    Procedures      FINAL IMPRESSION      1.  Dehydration          DISPOSITION/PLAN

## 2021-06-14 NOTE — ED NOTES
Urine pregnancy negative, istat 0.5     [facility]  06/14/21 4402

## 2021-06-17 ENCOUNTER — OFFICE VISIT (OUTPATIENT)
Dept: FAMILY MEDICINE CLINIC | Age: 21
End: 2021-06-17
Payer: COMMERCIAL

## 2021-06-17 VITALS
SYSTOLIC BLOOD PRESSURE: 112 MMHG | OXYGEN SATURATION: 98 % | WEIGHT: 131.4 LBS | BODY MASS INDEX: 22.43 KG/M2 | HEIGHT: 64 IN | TEMPERATURE: 98.2 F | DIASTOLIC BLOOD PRESSURE: 84 MMHG | HEART RATE: 79 BPM

## 2021-06-17 DIAGNOSIS — F41.9 ANXIETY: ICD-10-CM

## 2021-06-17 DIAGNOSIS — R11.0 NAUSEA: Primary | ICD-10-CM

## 2021-06-17 PROCEDURE — G8428 CUR MEDS NOT DOCUMENT: HCPCS | Performed by: NURSE PRACTITIONER

## 2021-06-17 PROCEDURE — G8420 CALC BMI NORM PARAMETERS: HCPCS | Performed by: NURSE PRACTITIONER

## 2021-06-17 PROCEDURE — 1036F TOBACCO NON-USER: CPT | Performed by: NURSE PRACTITIONER

## 2021-06-17 PROCEDURE — 99214 OFFICE O/P EST MOD 30 MIN: CPT | Performed by: NURSE PRACTITIONER

## 2021-06-17 RX ORDER — ONDANSETRON 4 MG/1
4 TABLET, ORALLY DISINTEGRATING ORAL 3 TIMES DAILY PRN
Qty: 10 TABLET | Refills: 0 | Status: SHIPPED | OUTPATIENT
Start: 2021-06-17 | End: 2022-04-01

## 2021-06-17 SDOH — ECONOMIC STABILITY: FOOD INSECURITY: WITHIN THE PAST 12 MONTHS, THE FOOD YOU BOUGHT JUST DIDN'T LAST AND YOU DIDN'T HAVE MONEY TO GET MORE.: NEVER TRUE

## 2021-06-17 SDOH — ECONOMIC STABILITY: FOOD INSECURITY: WITHIN THE PAST 12 MONTHS, YOU WORRIED THAT YOUR FOOD WOULD RUN OUT BEFORE YOU GOT MONEY TO BUY MORE.: NEVER TRUE

## 2021-06-17 ASSESSMENT — ENCOUNTER SYMPTOMS
ABDOMINAL PAIN: 0
NAUSEA: 1
CHEST TIGHTNESS: 0
SHORTNESS OF BREATH: 0
EYE PAIN: 0
COUGH: 0
TROUBLE SWALLOWING: 0
CONSTIPATION: 0
DIARRHEA: 0
VOMITING: 0

## 2021-06-17 ASSESSMENT — SOCIAL DETERMINANTS OF HEALTH (SDOH): HOW HARD IS IT FOR YOU TO PAY FOR THE VERY BASICS LIKE FOOD, HOUSING, MEDICAL CARE, AND HEATING?: NOT HARD AT ALL

## 2021-06-17 NOTE — PROGRESS NOTES
Subjective  Chief Complaint   Patient presents with    ED Follow-up     stomach is still boothering       HPI    Patient was seen in ED for abdominal pain. Had been on vacation prior to in [place] where it was really hot outside. Started with vomiting on Saturday afternoon and lasted all day Sunday. Was seen in ED Monday morning. ED note:      HISTORY OF PRESENT ILLNESS  (Location/Symptom, Timing/Onset, Context/Setting, Quality, Duration, Modifying Factors, Severity.)   Keith Martel is a 22 y.o. female who presents to the emergency department with complaints of abdominal pain that began on Saturday and has persisted throughout the day today. Pain is primarily through the lower abdomen. Patient states that she then began to have nausea and vomiting and has been unable to tolerate even p.o. water due to the vomiting. Patient states that she has not urinated at all today. Patient states that the pain is diffusely through the lower abdomen with intermittent spasms of pain in the upper abdomen. Patient denies any chest pain or shortness of breath. Patient denies any diarrhea or change in bowel habits preceding the symptoms. There is no known fevers. Patient denies any chance of pregnancy. REASSESSMENT         MDM  Number of Diagnoses or Management Options  Dehydration  Diagnosis management comments: Feeling well on my eval.  Abdomen soft and nontender. Given 2L fluids and tolerating fluids prior to discharge. Patient will be discharged home in good condition. Patient has been hemodynamically stable throughout ED course and is appropriate for outpatient follow up. Patient should follow up with PCP in 2-3 days or return to ED immediately for any new or worsening symptoms. Patient is well appearing on discharge and mom agreeable with plan of care.   FINAL IMPRESSION       1. Dehydration           DISPOSITION/PLAN   DISPOSITION Decision To Discharge 06/14/2021 03:14:14 AM    Patient has been taking zofran daily in the morning. Still has some nausea. Has been eating a bland diet and is able to keep food and fluids down- soups, crackers, bananas, toast. Drinking plenty of water. Denies diarrhea or vomiting, fevers, chills, or body aches. Denies tenderness. Slight abdominal tenderness lower quadrants. Anxiety doing okay. Feels fine not much different than previous visits. Past Medical History:   Diagnosis Date    Anxiety     Gastritis      There are no problems to display for this patient. Past Surgical History:   Procedure Laterality Date    UPPER GASTROINTESTINAL ENDOSCOPY  2018     No family history on file. Social History     Socioeconomic History    Marital status: Single     Spouse name: None    Number of children: None    Years of education: None    Highest education level: None   Occupational History    None   Tobacco Use    Smoking status: Never Smoker    Smokeless tobacco: Never Used   Substance and Sexual Activity    Alcohol use: No    Drug use: No    Sexual activity: Never   Other Topics Concern    None   Social History Narrative    None     Social Determinants of Health     Financial Resource Strain: Low Risk     Difficulty of Paying Living Expenses: Not hard at all   Food Insecurity: No Food Insecurity    Worried About Running Out of Food in the Last Year: Never true    Debbie of Food in the Last Year: Never true   Transportation Needs: No Transportation Needs    Lack of Transportation (Medical): No    Lack of Transportation (Non-Medical):  No   Physical Activity:     Days of Exercise per Week:     Minutes of Exercise per Session:    Stress:     Feeling of Stress :    Social Connections:     Frequency of Communication with Friends and Family:     Frequency of Social Gatherings with Friends and Family:     Attends [employer] Services:     Active Member of Clubs or Organizations:     Attends Club or Organization Meetings:     Marital Status:    Intimate Partner Violence:     Fear of Current or Ex-Partner:     Emotionally Abused:     Physically Abused:     Sexually Abused:      Current Outpatient Medications on File Prior to Visit   Medication Sig Dispense Refill    venlafaxine (EFFEXOR XR) 37.5 MG extended release capsule Take 1 capsule by mouth daily 30 capsule 1    venlafaxine (EFFEXOR XR) 75 MG extended release capsule TAKE ONE CAPSULE BY MOUTH DAILY 30 capsule 5    drospirenone-ethinyl estradiol (NICHOL) 3-0.02 MG per tablet TAKE ONE TABLET BY MOUTH EVERY DAY 28 tablet 11     No current facility-administered medications on file prior to visit. No Known Allergies    Review of Systems   Constitutional: Negative for activity change, appetite change and fatigue. HENT: Negative for ear pain, hearing loss and trouble swallowing. Eyes: Negative for pain and visual disturbance. Respiratory: Negative for cough, chest tightness and shortness of breath. Cardiovascular: Negative for chest pain, palpitations and leg swelling. Gastrointestinal: Positive for nausea. Negative for abdominal pain, constipation, diarrhea and vomiting. Genitourinary: Negative for difficulty urinating. Musculoskeletal: Negative. Neurological: Negative for dizziness and light-headedness. Objective  Vitals:    06/17/21 1519   BP: 112/84   Pulse: 79   Temp: 98.2 °F (36.8 °C)   SpO2: 98%   Weight: 131 lb 6.4 oz (59.6 kg)   Height: 5' 4\" (1.626 m)     Physical Exam  Vitals and nursing note reviewed. Constitutional:       General: She is not in acute distress. Appearance: Normal appearance. She is well-developed. She is not ill-appearing. HENT:      Head: Normocephalic and atraumatic. Right Ear: Hearing and external ear normal.      Left Ear: Hearing and external ear normal.   Eyes:      General:         Right eye: No discharge. Left eye: No discharge. Pupils: Pupils are equal, round, and reactive to light. Neck:      Thyroid: No thyromegaly. Cardiovascular:      Rate and Rhythm: Normal rate and regular rhythm. Heart sounds: Normal heart sounds. No murmur heard. No friction rub. No gallop. Pulmonary:      Effort: Pulmonary effort is normal. No respiratory distress. Breath sounds: Normal breath sounds. No stridor. No wheezing, rhonchi or rales. Chest:      Chest wall: No tenderness. Musculoskeletal:      Right lower leg: No edema. Left lower leg: No edema. Lymphadenopathy:      Cervical: No cervical adenopathy. Skin:     General: Skin is warm and dry. Coloration: Skin is not jaundiced or pale. Findings: No bruising, erythema, lesion or rash. Neurological:      General: No focal deficit present. Mental Status: She is alert and oriented to person, place, and time. Mental status is at baseline. Psychiatric:         Mood and Affect: Mood normal.         Speech: Speech normal.         Behavior: Behavior normal.         Thought Content: Thought content normal.         Judgment: Judgment normal.         Assessment& Plan     Diagnosis Orders   1. Nausea  ondansetron (ZOFRAN-ODT) 4 MG disintegrating tablet   2. Anxiety  External Referral to Psychiatry         Orders Placed This Encounter   Procedures    External Referral to Psychiatry     Referral Priority:   Routine     Referral Type:   Eval and Treat     Referral Reason:   Specialty Services Required     Requested Specialty:   Psychiatry     Number of Visits Requested:   1       Orders Placed This Encounter   Medications    ondansetron (ZOFRAN-ODT) 4 MG disintegrating tablet     Sig: Take 1 tablet by mouth 3 times daily as needed for Nausea or Vomiting     Dispense:  10 tablet     Refill:  0     Continue taking zofran as needed for nausea. Continue with bland diet and slowly progress as tolerated to regular diet. Follow up with psychiatry referral.  Follow up in 3 months or sooner if needed for anxiety.     Side effects, adverse effects of the medication

## 2021-07-14 ENCOUNTER — OFFICE VISIT (OUTPATIENT)
Dept: FAMILY MEDICINE CLINIC | Age: 21
End: 2021-07-14
Payer: COMMERCIAL

## 2021-07-14 VITALS
WEIGHT: 133 LBS | OXYGEN SATURATION: 99 % | DIASTOLIC BLOOD PRESSURE: 62 MMHG | SYSTOLIC BLOOD PRESSURE: 104 MMHG | TEMPERATURE: 97.5 F | HEIGHT: 64 IN | BODY MASS INDEX: 22.71 KG/M2 | HEART RATE: 84 BPM

## 2021-07-14 DIAGNOSIS — R10.33 PERIUMBILICAL PAIN: ICD-10-CM

## 2021-07-14 DIAGNOSIS — R11.0 NAUSEA: Primary | ICD-10-CM

## 2021-07-14 PROCEDURE — G8420 CALC BMI NORM PARAMETERS: HCPCS | Performed by: FAMILY MEDICINE

## 2021-07-14 PROCEDURE — 99214 OFFICE O/P EST MOD 30 MIN: CPT | Performed by: FAMILY MEDICINE

## 2021-07-14 PROCEDURE — G8427 DOCREV CUR MEDS BY ELIG CLIN: HCPCS | Performed by: FAMILY MEDICINE

## 2021-07-14 PROCEDURE — 1036F TOBACCO NON-USER: CPT | Performed by: FAMILY MEDICINE

## 2021-07-14 ASSESSMENT — ENCOUNTER SYMPTOMS
DIARRHEA: 0
BLOOD IN STOOL: 0
CONSTIPATION: 0
VOMITING: 1
ABDOMINAL DISTENTION: 1
ABDOMINAL PAIN: 1
ANAL BLEEDING: 0
NAUSEA: 1

## 2021-07-14 NOTE — PATIENT INSTRUCTIONS
Pt will do complete dairy and lactose avoidance for 2 weeks.   If successful will discuss long term strategies and if not, refer to GI for diagnostics

## 2021-07-14 NOTE — PROGRESS NOTES
Diagnosis Orders   1. Nausea     2. Periumbilical pain       No follow-ups on file. Patient Instructions   Pt will do complete dairy and lactose avoidance for 2 weeks. If successful will discuss long term strategies and if not, refer to GI for diagnostics      Subjective:      Patient ID: Nam Castillo is a 22 y.o. female who presents for:  Chief Complaint   Patient presents with    Abdominal Pain     x 2 weeks , last BM this morning        Reviewed with patient and mother patient's prior history abdominal pain, evaluation by GI, laboratories reviewed, CAT scan. Discussed prior dietary avoidance. Patient and mother state they did gluten avoidance for couple months and that did not change anything. Patient states that she notes that she gets some discomfort in the form of nausea and then pain that settles around the umbilicus when she has the discomfort. It has lasted all day at times. Other times it is just episodic. Has resulted in vomiting. Never resulted in projectile vomiting. Bowel movements are daily, soft, brown nonfloating nonoily. No pain with passing bowel movements. Current Outpatient Medications on File Prior to Visit   Medication Sig Dispense Refill    ondansetron (ZOFRAN-ODT) 4 MG disintegrating tablet Take 1 tablet by mouth 3 times daily as needed for Nausea or Vomiting 10 tablet 0    venlafaxine (EFFEXOR XR) 37.5 MG extended release capsule Take 1 capsule by mouth daily 30 capsule 1    venlafaxine (EFFEXOR XR) 75 MG extended release capsule TAKE ONE CAPSULE BY MOUTH DAILY 30 capsule 5    drospirenone-ethinyl estradiol (NICHOL) 3-0.02 MG per tablet TAKE ONE TABLET BY MOUTH EVERY DAY 28 tablet 11     No current facility-administered medications on file prior to visit.      Past Medical History:   Diagnosis Date    Anxiety     Gastritis      Past Surgical History:   Procedure Laterality Date    UPPER GASTROINTESTINAL ENDOSCOPY  2018     Social History     Socioeconomic History  Marital status: Single     Spouse name: Not on file    Number of children: Not on file    Years of education: Not on file    Highest education level: Not on file   Occupational History    Not on file   Tobacco Use    Smoking status: Never Smoker    Smokeless tobacco: Never Used   Substance and Sexual Activity    Alcohol use: No    Drug use: No    Sexual activity: Never   Other Topics Concern    Not on file   Social History Narrative    Not on file     Social Determinants of Health     Financial Resource Strain: Low Risk     Difficulty of Paying Living Expenses: Not hard at all   Food Insecurity: No Food Insecurity    Worried About Running Out of Food in the Last Year: Never true    Debbie of Food in the Last Year: Never true   Transportation Needs: No Transportation Needs    Lack of Transportation (Medical): No    Lack of Transportation (Non-Medical): No   Physical Activity:     Days of Exercise per Week:     Minutes of Exercise per Session:    Stress:     Feeling of Stress :    Social Connections:     Frequency of Communication with Friends and Family:     Frequency of Social Gatherings with Friends and Family:     Attends Orthodoxy Services:     Active Member of Clubs or Organizations:     Attends Club or Organization Meetings:     Marital Status:    Intimate Partner Violence:     Fear of Current or Ex-Partner:     Emotionally Abused:     Physically Abused:     Sexually Abused:      History reviewed. No pertinent family history. Allergies:  Patient has no known allergies. Review of Systems   Constitutional: Negative for chills, diaphoresis, fatigue and fever. Gastrointestinal: Positive for abdominal distention, abdominal pain, nausea and vomiting. Negative for anal bleeding, blood in stool, constipation and diarrhea. Neurological: Negative for tremors and weakness.        Objective:   /62   Pulse 84   Temp 97.5 °F (36.4 °C)   Ht 5' 4\" (1.626 m)   Wt 133 lb (60.3 1:15 AM   Result Value Ref Range    WBC 7.9 4.5 - 11.0 K/uL    RBC 4.84 4.20 - 5.40 M/uL    Hemoglobin 14.3 12.0 - 16.0 g/dL    Hematocrit 41.5 37.0 - 47.0 %    MCV 85.7 82.0 - 100.0 fL    MCH 29.6 27.0 - 31.3 pg    MCHC 34.6 33.0 - 37.0 %    RDW 12.5 11.5 - 14.5 %    Platelets 968 955 - 031 K/uL    Neutrophils % 59.7 %    Lymphocytes % 30.7 %    Monocytes % 8.5 %    Eosinophils % 0.8 %    Basophils % 0.3 %    Neutrophils Absolute 4.7 1.4 - 6.5 K/uL    Lymphocytes Absolute 2.4 1.0 - 4.8 K/uL    Monocytes Absolute 0.7 0.2 - 0.8 K/uL    Eosinophils Absolute 0.1 0.0 - 0.7 K/uL    Basophils Absolute 0.0 0.0 - 0.2 K/uL   Lactic Acid, Plasma    Collection Time: 06/14/21  1:15 AM   Result Value Ref Range    Lactic Acid 1.7 0.5 - 2.2 mmol/L   Lipase    Collection Time: 06/14/21  1:15 AM   Result Value Ref Range    Lipase 34 12 - 95 U/L   Urine Reflex to Culture    Collection Time: 06/14/21  1:15 AM    Specimen: Urine, clean catch   Result Value Ref Range    Color, UA Yellow Straw/Yellow    Clarity, UA Clear Clear    Glucose, Ur Negative Negative mg/dL    Bilirubin Urine Negative Negative    Ketones, Urine >=80 (A) Negative mg/dL    Specific Gravity, UA 1.024 1.005 - 1.030    Blood, Urine Negative Negative    pH, UA 7.0 5.0 - 9.0    Protein, UA TRACE (A) Negative mg/dL    Urobilinogen, Urine 1.0 <2.0 E.U./dL    Nitrite, Urine Negative Negative    Leukocyte Esterase, Urine Negative Negative    Urine Reflex to Culture Not Indicated    POCT Venous    Collection Time: 06/14/21  2:01 AM   Result Value Ref Range    POC Creatinine 0.5 (L) 0.6 - 1.1 mg/dL    GFR Non-African American >60 >60    GFR  >60 >60    Sample Type LAVON     Performed on SEE BELOW      Pt was seen by provider for 30  Minutes  Counseling and coordination of care was done for all assessment diagnosis listed for today with patient and any family/friend present.    More than 50% of this visit was spent coordinating cuurent care, obtaining information for prior records, and counseling for current plan of action. Assessment:       Diagnosis Orders   1. Nausea     2. Periumbilical pain           No orders of the defined types were placed in this encounter. Plan:   No follow-ups on file. Patient Instructions   Pt will do complete dairy and lactose avoidance for 2 weeks. If successful will discuss long term strategies and if not, refer to GI for diagnostics      This note was partially created with the assistance of dictation. This may lead to grammatical or spelling errors. Jose Luis Ortega M.D.

## 2021-07-22 ENCOUNTER — TELEPHONE (OUTPATIENT)
Dept: FAMILY MEDICINE CLINIC | Age: 21
End: 2021-07-22

## 2021-07-22 ENCOUNTER — NURSE TRIAGE (OUTPATIENT)
Dept: OTHER | Facility: CLINIC | Age: 21
End: 2021-07-22

## 2021-07-22 NOTE — TELEPHONE ENCOUNTER
Received call from [facility] with Red Flag Complaint. Brief description of triage:   Pt's mother Felicitas Day is calling. She states that the pt c/o off and on nausea and abdominal pain. Pt has been to the ED and has seen a provider for this recently. Mother is not sure if she is having new or worsening symptoms. Unable to triage d/t pt not being present with the caller. Advised that the pt go to the ED if symptoms are urgent or life threatening. Care advice provided, patient verbalizes understanding; denies any other questions or concerns; instructed to call back for any new or worsening symptoms. Writer provided warm transfer to [facility] for appointment scheduling. Attention Provider: Thank you for allowing me to participate in the care of your patient. The patient was connected to triage in response to information provided to the ECC. Please do not respond through this encounter as the response is not directed to a shared pool.     Reason for Disposition   Caller has already spoken with the PCP (or office), and has no further questions    Protocols used: NO CONTACT OR DUPLICATE CONTACT CALL-ADULT-[place]

## 2021-07-22 NOTE — TELEPHONE ENCOUNTER
Pt mom transferred from [facility], pt having severe abdominal pain  Was saying pain was rated  level 8.   Mom wanted to bring in to urgent care advised ER        SAIRA

## 2021-10-11 ENCOUNTER — OFFICE VISIT (OUTPATIENT)
Dept: FAMILY MEDICINE CLINIC | Age: 21
End: 2021-10-11
Payer: COMMERCIAL

## 2021-10-11 VITALS
OXYGEN SATURATION: 99 % | HEIGHT: 64 IN | DIASTOLIC BLOOD PRESSURE: 62 MMHG | HEART RATE: 82 BPM | BODY MASS INDEX: 22.71 KG/M2 | WEIGHT: 133 LBS | TEMPERATURE: 98 F | SYSTOLIC BLOOD PRESSURE: 100 MMHG

## 2021-10-11 DIAGNOSIS — F41.9 ANXIETY: ICD-10-CM

## 2021-10-11 DIAGNOSIS — B07.0 PLANTAR WART: Primary | ICD-10-CM

## 2021-10-11 PROCEDURE — G8484 FLU IMMUNIZE NO ADMIN: HCPCS | Performed by: FAMILY MEDICINE

## 2021-10-11 PROCEDURE — 99213 OFFICE O/P EST LOW 20 MIN: CPT | Performed by: FAMILY MEDICINE

## 2021-10-11 PROCEDURE — G8427 DOCREV CUR MEDS BY ELIG CLIN: HCPCS | Performed by: FAMILY MEDICINE

## 2021-10-11 PROCEDURE — 1036F TOBACCO NON-USER: CPT | Performed by: FAMILY MEDICINE

## 2021-10-11 PROCEDURE — G8420 CALC BMI NORM PARAMETERS: HCPCS | Performed by: FAMILY MEDICINE

## 2021-10-11 RX ORDER — ARIPIPRAZOLE 10 MG/1
TABLET ORAL
COMMUNITY
Start: 2021-10-06 | End: 2022-04-01

## 2021-10-11 ASSESSMENT — ENCOUNTER SYMPTOMS
DIARRHEA: 0
COUGH: 0
SHORTNESS OF BREATH: 0
SORE THROAT: 0

## 2021-10-11 NOTE — PROGRESS NOTES
Diagnosis Orders   1. Plantar wart     2. Anxiety       Return for keep next planned appointment. Patient Instructions   Patient demonstrates no signs of infection. Cleared for surgery for wart removal of the foot with sedation. Subjective:      Patient ID: Shala Beltran is a 22 y.o. female who presents for:  Chief Complaint   Patient presents with    Skin Exam    Skin Problem     lesion on bottom of right foot - Pt has tried OTC with no results , pt states that it has been frozen a few times also with out result . Patient is here for clearance of surgery for resistant plantar wart that is can be removed at a surgery center under sedation. Has some issues with anxiety in the past this technique seems to be the most agreeable for the patient. Current Outpatient Medications on File Prior to Visit   Medication Sig Dispense Refill    ARIPiprazole (ABILIFY) 10 MG tablet       ondansetron (ZOFRAN-ODT) 4 MG disintegrating tablet Take 1 tablet by mouth 3 times daily as needed for Nausea or Vomiting 10 tablet 0    drospirenone-ethinyl estradiol (NICHOL) 3-0.02 MG per tablet TAKE ONE TABLET BY MOUTH EVERY DAY 28 tablet 11    venlafaxine (EFFEXOR XR) 37.5 MG extended release capsule Take 1 capsule by mouth daily (Patient not taking: Reported on 10/11/2021) 30 capsule 1    venlafaxine (EFFEXOR XR) 75 MG extended release capsule TAKE ONE CAPSULE BY MOUTH DAILY (Patient not taking: Reported on 10/11/2021) 30 capsule 5     No current facility-administered medications on file prior to visit.      Past Medical History:   Diagnosis Date    Anxiety     Gastritis      Past Surgical History:   Procedure Laterality Date    UPPER GASTROINTESTINAL ENDOSCOPY  2018     Social History     Socioeconomic History    Marital status: Single     Spouse name: Not on file    Number of children: Not on file    Years of education: Not on file    Highest education level: Not on file   Occupational History    Not on file Tobacco Use    Smoking status: Never Smoker    Smokeless tobacco: Never Used   Substance and Sexual Activity    Alcohol use: No    Drug use: No    Sexual activity: Never   Other Topics Concern    Not on file   Social History Narrative    Not on file     Social Determinants of Health     Financial Resource Strain: Low Risk     Difficulty of Paying Living Expenses: Not hard at all   Food Insecurity: No Food Insecurity    Worried About Running Out of Food in the Last Year: Never true    Debbie of Food in the Last Year: Never true   Transportation Needs: No Transportation Needs    Lack of Transportation (Medical): No    Lack of Transportation (Non-Medical): No   Physical Activity:     Days of Exercise per Week:     Minutes of Exercise per Session:    Stress:     Feeling of Stress :    Social Connections:     Frequency of Communication with Friends and Family:     Frequency of Social Gatherings with Friends and Family:     Attends Yazidism Services:     Active Member of Clubs or Organizations:     Attends Club or Organization Meetings:     Marital Status:    Intimate Partner Violence:     Fear of Current or Ex-Partner:     Emotionally Abused:     Physically Abused:     Sexually Abused:      History reviewed. No pertinent family history. Allergies:  Patient has no known allergies. Review of Systems   Constitutional: Negative for chills and fever. HENT: Negative for congestion and sore throat. Respiratory: Negative for cough and shortness of breath. Gastrointestinal: Negative for diarrhea. Genitourinary: Negative for dysuria and frequency. Allergic/Immunologic: Negative for environmental allergies, food allergies and immunocompromised state. Hematological: Negative for adenopathy. Does not bruise/bleed easily. Psychiatric/Behavioral: Negative for behavioral problems and sleep disturbance.        Objective:   /62   Pulse 82   Temp 98 °F (36.7 °C)   Ht 5' 4\" (1.626 m) Wt 133 lb (60.3 kg)   SpO2 99%   BMI 22.83 kg/m²     Physical Exam  Constitutional:       General: She is not in acute distress. Appearance: Normal appearance. She is well-developed. She is not diaphoretic. HENT:      Head: Normocephalic and atraumatic. Right Ear: Hearing, tympanic membrane, ear canal and external ear normal. No drainage. Left Ear: Hearing, tympanic membrane, ear canal and external ear normal. No drainage. Nose: No nasal deformity, mucosal edema or rhinorrhea. Mouth/Throat:      Dentition: Normal dentition. Pharynx: No oropharyngeal exudate, posterior oropharyngeal erythema or uvula swelling. Eyes:      General: Lids are normal.      Conjunctiva/sclera: Conjunctivae normal.      Right eye: No exudate. Left eye: No exudate. Pupils: Pupils are equal, round, and reactive to light. Neck:      Thyroid: No thyromegaly. Vascular: Normal carotid pulses. Trachea: No tracheal deviation. Cardiovascular:      Rate and Rhythm: Normal rate and regular rhythm. Chest Wall: PMI displaced: nl PMI. Pulses:           Carotid pulses are 2+ on the right side and 2+ on the left side. Radial pulses are 2+ on the right side and 2+ on the left side. Heart sounds: S1 normal and S2 normal. No murmur heard. No friction rub. No gallop. Pulmonary:      Effort: Pulmonary effort is normal.      Breath sounds: Normal breath sounds. No wheezing, rhonchi or rales. Musculoskeletal:      Cervical back: Full passive range of motion without pain and neck supple. Lymphadenopathy:      Head:      Right side of head: No preauricular adenopathy. Left side of head: No preauricular adenopathy. Cervical:      Right cervical: No superficial cervical adenopathy. Left cervical: No superficial cervical adenopathy. Skin:     Coloration: Skin is not pale. Findings: No ecchymosis or rash. Nails: There is no clubbing.    Neurological: Mental Status: She is alert. Motor: No abnormal muscle tone. Gait: Gait normal.   Psychiatric:         Mood and Affect: Mood is not anxious. Affect is not inappropriate. Speech: Speech normal.         Behavior: Behavior is not agitated. Judgment: Judgment normal.         No results found for this visit on 10/11/21. No results found for this or any previous visit (from the past 2016 hour(s)). [] Pt was seen by provider for      Minutes  Counseling and coordination of care was done for all assessment diagnosis listed for today with patient and any family/friend present. More than 50% of this visit was spent coordinating cuurent care, obtaining information for prior records, and counseling for current plan of action. Assessment:       Diagnosis Orders   1. Plantar wart     2. Anxiety           No orders of the defined types were placed in this encounter. No orders of the defined types were placed in this encounter. Medication List          Accurate as of October 11, 2021  1:05 PM. If you have any questions, ask your nurse or doctor. CONTINUE taking these medications    ARIPiprazole 10 MG tablet  Commonly known as: ABILIFY     drospirenone-ethinyl estradiol 3-0.02 MG per tablet  Commonly known as: NICHOL  TAKE ONE TABLET BY MOUTH EVERY DAY     ondansetron 4 MG disintegrating tablet  Commonly known as: ZOFRAN-ODT  Take 1 tablet by mouth 3 times daily as needed for Nausea or Vomiting     * venlafaxine 75 MG extended release capsule  Commonly known as: EFFEXOR XR  TAKE ONE CAPSULE BY MOUTH DAILY     * venlafaxine 37.5 MG extended release capsule  Commonly known as: EFFEXOR XR  Take 1 capsule by mouth daily         * This list has 2 medication(s) that are the same as other medications prescribed for you. Read the directions carefully, and ask your doctor or other care provider to review them with you.                   Plan:   Return for keep next planned appointment. Patient Instructions   Patient demonstrates no signs of infection. Cleared for surgery for wart removal of the foot with sedation. This note was partially created with the assistance of dictation. This may lead to grammatical or spelling errors. Jose Luis Hackett M.D.

## 2021-10-11 NOTE — PATIENT INSTRUCTIONS
Patient demonstrates no signs of infection. Cleared for surgery for wart removal of the foot with sedation.

## 2022-04-01 ENCOUNTER — OFFICE VISIT (OUTPATIENT)
Dept: FAMILY MEDICINE CLINIC | Age: 22
End: 2022-04-01
Payer: COMMERCIAL

## 2022-04-01 VITALS
WEIGHT: 148 LBS | HEIGHT: 64 IN | TEMPERATURE: 96.6 F | SYSTOLIC BLOOD PRESSURE: 108 MMHG | DIASTOLIC BLOOD PRESSURE: 64 MMHG | HEART RATE: 75 BPM | OXYGEN SATURATION: 99 % | BODY MASS INDEX: 25.27 KG/M2

## 2022-04-01 DIAGNOSIS — R53.83 FATIGUE, UNSPECIFIED TYPE: ICD-10-CM

## 2022-04-01 DIAGNOSIS — N91.2 AMENORRHEA: ICD-10-CM

## 2022-04-01 DIAGNOSIS — N91.2 AMENORRHEA: Primary | ICD-10-CM

## 2022-04-01 LAB
BASOPHILS ABSOLUTE: 0 K/UL (ref 0–0.2)
BASOPHILS RELATIVE PERCENT: 0.6 %
EOSINOPHILS ABSOLUTE: 0.1 K/UL (ref 0–0.7)
EOSINOPHILS RELATIVE PERCENT: 1.2 %
HCG(URINE) PREGNANCY TEST: NEGATIVE
HCT VFR BLD CALC: 39.8 % (ref 37–47)
HEMOGLOBIN: 13.4 G/DL (ref 12–16)
LYMPHOCYTES ABSOLUTE: 1.8 K/UL (ref 1–4.8)
LYMPHOCYTES RELATIVE PERCENT: 31.5 %
MCH RBC QN AUTO: 29.5 PG (ref 27–31.3)
MCHC RBC AUTO-ENTMCNC: 33.6 % (ref 33–37)
MCV RBC AUTO: 87.7 FL (ref 82–100)
MONOCYTES ABSOLUTE: 0.5 K/UL (ref 0.2–0.8)
MONOCYTES RELATIVE PERCENT: 8.3 %
NEUTROPHILS ABSOLUTE: 3.3 K/UL (ref 1.4–6.5)
NEUTROPHILS RELATIVE PERCENT: 58.4 %
PDW BLD-RTO: 13.2 % (ref 11.5–14.5)
PLATELET # BLD: 290 K/UL (ref 130–400)
PROLACTIN: 15.3 NG/ML
RBC # BLD: 4.54 M/UL (ref 4.2–5.4)
TSH REFLEX: 0.64 UIU/ML (ref 0.44–3.86)
WBC # BLD: 5.7 K/UL (ref 4.5–11)

## 2022-04-01 PROCEDURE — G8419 CALC BMI OUT NRM PARAM NOF/U: HCPCS | Performed by: STUDENT IN AN ORGANIZED HEALTH CARE EDUCATION/TRAINING PROGRAM

## 2022-04-01 PROCEDURE — 99214 OFFICE O/P EST MOD 30 MIN: CPT | Performed by: STUDENT IN AN ORGANIZED HEALTH CARE EDUCATION/TRAINING PROGRAM

## 2022-04-01 PROCEDURE — 1036F TOBACCO NON-USER: CPT | Performed by: STUDENT IN AN ORGANIZED HEALTH CARE EDUCATION/TRAINING PROGRAM

## 2022-04-01 PROCEDURE — G8427 DOCREV CUR MEDS BY ELIG CLIN: HCPCS | Performed by: STUDENT IN AN ORGANIZED HEALTH CARE EDUCATION/TRAINING PROGRAM

## 2022-04-01 ASSESSMENT — ENCOUNTER SYMPTOMS
SINUS PRESSURE: 0
VOMITING: 0
ABDOMINAL PAIN: 0
SORE THROAT: 0
SHORTNESS OF BREATH: 0
COUGH: 0

## 2022-04-01 ASSESSMENT — PATIENT HEALTH QUESTIONNAIRE - PHQ9
8. MOVING OR SPEAKING SO SLOWLY THAT OTHER PEOPLE COULD HAVE NOTICED. OR THE OPPOSITE, BEING SO FIGETY OR RESTLESS THAT YOU HAVE BEEN MOVING AROUND A LOT MORE THAN USUAL: 0
3. TROUBLE FALLING OR STAYING ASLEEP: 0
2. FEELING DOWN, DEPRESSED OR HOPELESS: 0
9. THOUGHTS THAT YOU WOULD BE BETTER OFF DEAD, OR OF HURTING YOURSELF: 0
SUM OF ALL RESPONSES TO PHQ QUESTIONS 1-9: 0
6. FEELING BAD ABOUT YOURSELF - OR THAT YOU ARE A FAILURE OR HAVE LET YOURSELF OR YOUR FAMILY DOWN: 0
5. POOR APPETITE OR OVEREATING: 0
4. FEELING TIRED OR HAVING LITTLE ENERGY: 0
SUM OF ALL RESPONSES TO PHQ QUESTIONS 1-9: 0
SUM OF ALL RESPONSES TO PHQ QUESTIONS 1-9: 0
7. TROUBLE CONCENTRATING ON THINGS, SUCH AS READING THE NEWSPAPER OR WATCHING TELEVISION: 0
10. IF YOU CHECKED OFF ANY PROBLEMS, HOW DIFFICULT HAVE THESE PROBLEMS MADE IT FOR YOU TO DO YOUR WORK, TAKE CARE OF THINGS AT HOME, OR GET ALONG WITH OTHER PEOPLE: 0
SUM OF ALL RESPONSES TO PHQ QUESTIONS 1-9: 0

## 2022-04-01 NOTE — PROGRESS NOTES
2022    Lena Garcia (:  2000) is a 22 y.o. female, here for evaluation of the following medical concerns:  Chief Complaint   Patient presents with    Dysmenorrhea     Pt states shes had 1 period in the last 6 months and it only lasted a day. Pt states she stopped taking BC 3 months ago.  Fatigue     x1 month     HPI  Dysmenorrhea  Was on nichol for several years due to painful cramping during her periods  While on ES she had a period every 4-5 weeks  6 months ago she started to stop having any periods  Stopped nichol 3 months ago  2 months ago had light bleeding x 1 day  Has not had a period since then  Never had issues with irregular periods in the past  No chance that that she could be pregnant    Fatigue  Feeling very fatigued for the last one month  Stoppped effexor a while back  Mood has been fine since d/c Effexor  Sleeping well at night - 7-8 hours interrupted  No increased stress    Review of Systems   Constitutional: Positive for fatigue. Negative for chills and fever. HENT: Negative for congestion, sinus pressure and sore throat. Respiratory: Negative for cough and shortness of breath. Cardiovascular: Negative for chest pain and palpitations. Gastrointestinal: Negative for abdominal pain and vomiting. Genitourinary: Positive for menstrual problem. Negative for vaginal bleeding, vaginal discharge and vaginal pain. Musculoskeletal: Negative for arthralgias and myalgias. Skin: Negative for rash and wound. Neurological: Negative for speech difficulty and light-headedness. Psychiatric/Behavioral: Negative for suicidal ideas. The patient is not nervous/anxious.         Prior to Visit Medications    Not on File        Medications Discontinued During This Encounter   Medication Reason    drospirenone-ethinyl estradiol (NICHOL) 3-0.02 MG per tablet Patient Choice    ondansetron (ZOFRAN-ODT) 4 MG disintegrating tablet Patient Choice    ARIPiprazole (ABILIFY) 10 MG tablet Patient Choice    venlafaxine (EFFEXOR XR) 75 MG extended release capsule Patient Choice    venlafaxine (EFFEXOR XR) 37.5 MG extended release capsule Patient Choice       No Known Allergies    Past Medical History:   Diagnosis Date    Anxiety     Gastritis        Past Surgical History:   Procedure Laterality Date    UPPER GASTROINTESTINAL ENDOSCOPY  2018       Social History     Socioeconomic History    Marital status: Single     Spouse name: Not on file    Number of children: Not on file    Years of education: Not on file    Highest education level: Not on file   Occupational History    Not on file   Tobacco Use    Smoking status: Never Smoker    Smokeless tobacco: Never Used   Substance and Sexual Activity    Alcohol use: No    Drug use: No    Sexual activity: Never   Other Topics Concern    Not on file   Social History Narrative    Not on file     Social Determinants of Health     Financial Resource Strain: Low Risk     Difficulty of Paying Living Expenses: Not hard at all   Food Insecurity: No Food Insecurity    Worried About Running Out of Food in the Last Year: Never true    Debbie of Food in the Last Year: Never true   Transportation Needs: No Transportation Needs    Lack of Transportation (Medical): No    Lack of Transportation (Non-Medical):  No   Physical Activity:     Days of Exercise per Week: Not on file    Minutes of Exercise per Session: Not on file   Stress:     Feeling of Stress : Not on file   Social Connections:     Frequency of Communication with Friends and Family: Not on file    Frequency of Social Gatherings with Friends and Family: Not on file    Attends Hindu Services: Not on file    Active Member of Clubs or Organizations: Not on file    Attends Club or Organization Meetings: Not on file    Marital Status: Not on file   Intimate Partner Violence:     Fear of Current or Ex-Partner: Not on file    Emotionally Abused: Not on file    Physically Abused: Not on file  Sexually Abused: Not on file   Housing Stability:     Unable to Pay for Housing in the Last Year: Not on file    Number of Places Lived in the Last Year: Not on file    Unstable Housing in the Last Year: Not on file        No family history on file. Vitals:    04/01/22 1041   BP: 108/64   Pulse: 75   Temp: 96.6 °F (35.9 °C)   SpO2: 99%   Weight: 148 lb (67.1 kg)   Height: 5' 4\" (1.626 m)       Estimated body mass index is 25.4 kg/m² as calculated from the following:    Height as of this encounter: 5' 4\" (1.626 m). Weight as of this encounter: 148 lb (67.1 kg). No results for input(s): WBC, RBC, HGB, HCT, MCV, MCH, MCHC, RDW, PLT, MPV in the last 72 hours. No results for input(s): NA, K, CL, CO2, BUN, CREATININE, GLUCOSE, CALCIUM, PROT, LABALBU, BILITOT, ALKPHOS, AST, ALT in the last 72 hours. No results found for: LABA1C    No results found. Physical Exam  Constitutional:       General: She is not in acute distress. Appearance: Normal appearance. She is normal weight. HENT:      Head: Normocephalic and atraumatic. Eyes:      Extraocular Movements: Extraocular movements intact. Conjunctiva/sclera: Conjunctivae normal.   Cardiovascular:      Rate and Rhythm: Normal rate and regular rhythm. Pulses: Normal pulses. Heart sounds: Normal heart sounds. Pulmonary:      Effort: Pulmonary effort is normal.      Breath sounds: Normal breath sounds. No wheezing or rales. Musculoskeletal:         General: No deformity. Normal range of motion. Cervical back: Normal range of motion and neck supple. No tenderness. Skin:     General: Skin is warm. Capillary Refill: Capillary refill takes less than 2 seconds. Findings: No lesion or rash. Neurological:      General: No focal deficit present. Mental Status: She is alert. Mental status is at baseline.    Psychiatric:         Mood and Affect: Mood normal.         Behavior: Behavior normal.         Thought Content: Thought content normal.         Judgment: Judgment normal.         ASSESSMENT/PLAN:  1. Amenorrhea  Labs as ordered to check for underlying cause of amenorrhea and fatigue  We will call patient with results  Consider Wellbutrin for fatigue    - Follicle Stimulating Hormone; Future  - Luteinizing Hormone; Future  - Prolactin; Future  - Pregnancy, Urine; Future    2. Fatigue, unspecified type  - TSH with Reflex; Future  - CBC with Auto Differential; Future  - Vitamin D 25 Hydroxy; Future  - Follicle Stimulating Hormone; Future  - Luteinizing Hormone; Future  - Prolactin; Future  - Pregnancy, Urine; Future      Medications Discontinued During This Encounter   Medication Reason    drospirenone-ethinyl estradiol (NICHOL) 3-0.02 MG per tablet Patient Choice    ondansetron (ZOFRAN-ODT) 4 MG disintegrating tablet Patient Choice    ARIPiprazole (ABILIFY) 10 MG tablet Patient Choice    venlafaxine (EFFEXOR XR) 75 MG extended release capsule Patient Choice    venlafaxine (EFFEXOR XR) 37.5 MG extended release capsule Patient Choice       ---------------------------------------------------------------------  Side effects, adverse effects of the medication prescribed today, as well as treatment plan/ rationale and result expectations have been discussed with the patient who expresses understanding and desires to proceed. Close follow up to evaluate treatment results and for coordination of care. I have reviewed the patient's medical history in detail and updated the computerized patient record. As always, patient is advised that if symptoms worsen in any way they will proceed to the nearest emergency room. --------------------------------------------------------------------    Return if symptoms worsen or fail to improve. An  electronic signature was used to authenticate this note.     --Radha Suggs DO on 4/1/2022 at 11:03 AM

## 2022-04-02 LAB
FOLLICLE STIMULATING HORMONE: 2.4 MIU/ML (ref 1.7–21.5)
LH: 6.3 MIU/ML (ref 1–95.6)

## 2022-04-03 LAB — VITAMIN D 25-HYDROXY: 54 NG/ML

## 2022-04-05 DIAGNOSIS — N92.6 IRREGULAR PERIODS: Primary | ICD-10-CM

## 2022-04-05 NOTE — RESULT ENCOUNTER NOTE
Please let patient know overall her labwork looked okay. Her hormone levels were slightly off. If she experiencing any worsening acne, facial hair or weight gain? I am also going to order pt an US of the pelvis.

## 2022-04-14 ENCOUNTER — HOSPITAL ENCOUNTER (OUTPATIENT)
Dept: ULTRASOUND IMAGING | Age: 22
Discharge: HOME | End: 2022-04-16
Payer: COMMERCIAL

## 2022-04-14 DIAGNOSIS — N92.6 IRREGULAR PERIODS: ICD-10-CM

## 2022-04-14 PROCEDURE — 76830 TRANSVAGINAL US NON-OB: CPT

## 2022-04-14 PROCEDURE — 76856 US EXAM PELVIC COMPLETE: CPT

## 2022-09-08 ENCOUNTER — OFFICE VISIT (OUTPATIENT)
Dept: FAMILY MEDICINE CLINIC | Age: 22
End: 2022-09-08
Payer: COMMERCIAL

## 2022-09-08 VITALS
OXYGEN SATURATION: 98 % | SYSTOLIC BLOOD PRESSURE: 98 MMHG | HEIGHT: 64 IN | DIASTOLIC BLOOD PRESSURE: 62 MMHG | BODY MASS INDEX: 24.75 KG/M2 | HEART RATE: 81 BPM | WEIGHT: 145 LBS

## 2022-09-08 DIAGNOSIS — F41.9 ANXIETY: Primary | ICD-10-CM

## 2022-09-08 DIAGNOSIS — Z12.4 CERVICAL CANCER SCREENING: ICD-10-CM

## 2022-09-08 DIAGNOSIS — R11.0 NAUSEA: ICD-10-CM

## 2022-09-08 PROCEDURE — G8420 CALC BMI NORM PARAMETERS: HCPCS | Performed by: NURSE PRACTITIONER

## 2022-09-08 PROCEDURE — G8427 DOCREV CUR MEDS BY ELIG CLIN: HCPCS | Performed by: NURSE PRACTITIONER

## 2022-09-08 PROCEDURE — 99213 OFFICE O/P EST LOW 20 MIN: CPT | Performed by: NURSE PRACTITIONER

## 2022-09-08 PROCEDURE — 1036F TOBACCO NON-USER: CPT | Performed by: NURSE PRACTITIONER

## 2022-09-08 RX ORDER — ONDANSETRON 4 MG/1
4 TABLET, ORALLY DISINTEGRATING ORAL 3 TIMES DAILY PRN
Qty: 10 TABLET | Refills: 0 | Status: SHIPPED | OUTPATIENT
Start: 2022-09-08

## 2022-09-08 RX ORDER — HYDROXYZINE HYDROCHLORIDE 25 MG/1
25 TABLET, FILM COATED ORAL EVERY 8 HOURS PRN
Qty: 30 TABLET | Refills: 0 | Status: SHIPPED | OUTPATIENT
Start: 2022-09-08 | End: 2022-09-18

## 2022-09-08 SDOH — ECONOMIC STABILITY: FOOD INSECURITY: WITHIN THE PAST 12 MONTHS, YOU WORRIED THAT YOUR FOOD WOULD RUN OUT BEFORE YOU GOT MONEY TO BUY MORE.: NEVER TRUE

## 2022-09-08 SDOH — ECONOMIC STABILITY: FOOD INSECURITY: WITHIN THE PAST 12 MONTHS, THE FOOD YOU BOUGHT JUST DIDN'T LAST AND YOU DIDN'T HAVE MONEY TO GET MORE.: NEVER TRUE

## 2022-09-08 ASSESSMENT — ENCOUNTER SYMPTOMS
SHORTNESS OF BREATH: 0
COLOR CHANGE: 0
CONSTIPATION: 0
EYE PAIN: 0
ABDOMINAL PAIN: 0
DIARRHEA: 0
TROUBLE SWALLOWING: 0
CHEST TIGHTNESS: 0
BACK PAIN: 0
COUGH: 0

## 2022-09-08 ASSESSMENT — SOCIAL DETERMINANTS OF HEALTH (SDOH): HOW HARD IS IT FOR YOU TO PAY FOR THE VERY BASICS LIKE FOOD, HOUSING, MEDICAL CARE, AND HEATING?: NOT HARD AT ALL

## 2022-09-08 NOTE — PROGRESS NOTES
Subjective  Chief Complaint   Patient presents with    Anxiety     6 mo f/u, pt states her anxiety has gotten worse since she was last seen with Dr. Kingston Carnes. Health Maintenance     Pap smear ordered. HPI    Anxiety-overall doing well, much better than previous. Does still get a couple days per month where her anxiety is really bad. Typically will just go to her parents on those days and try to wait it out. Does not feel she needs something on a regular basis, but just something as needed to get her through those days. Past Medical History:   Diagnosis Date    Anxiety     Gastritis      There are no problems to display for this patient. Past Surgical History:   Procedure Laterality Date    UPPER GASTROINTESTINAL ENDOSCOPY  2018     No family history on file. Social History     Socioeconomic History    Marital status: Single     Spouse name: None    Number of children: None    Years of education: None    Highest education level: None   Tobacco Use    Smoking status: Never    Smokeless tobacco: Never   Substance and Sexual Activity    Alcohol use: No    Drug use: No    Sexual activity: Never     Social Determinants of Health     Financial Resource Strain: Low Risk     Difficulty of Paying Living Expenses: Not hard at all   Food Insecurity: No Food Insecurity    Worried About Running Out of Food in the Last Year: Never true    Ran Out of Food in the Last Year: Never true     No current outpatient medications on file prior to visit. No current facility-administered medications on file prior to visit. No Known Allergies    Review of Systems   Constitutional:  Negative for activity change, appetite change, chills, diaphoresis, fatigue and fever. HENT:  Negative for congestion, ear pain, hearing loss and trouble swallowing. Eyes:  Negative for pain and visual disturbance. Respiratory:  Negative for cough, chest tightness and shortness of breath.     Cardiovascular:  Negative for chest pain, palpitations and leg swelling. Gastrointestinal:  Negative for abdominal pain, constipation and diarrhea. Endocrine: Negative for polydipsia, polyphagia and polyuria. Genitourinary:  Negative for difficulty urinating and dysuria. Musculoskeletal:  Negative for arthralgias and back pain. Skin:  Negative for color change and rash. Neurological:  Negative for dizziness and light-headedness. Psychiatric/Behavioral:  Negative for dysphoric mood. The patient is nervous/anxious. Objective  Vitals:    09/08/22 1503   BP: 98/62   Site: Left Upper Arm   Position: Sitting   Cuff Size: Medium Adult   Pulse: 81   SpO2: 98%   Weight: 145 lb (65.8 kg)   Height: 5' 4\" (1.626 m)     Physical Exam  Constitutional:       General: She is not in acute distress. Appearance: Normal appearance. She is obese. She is not ill-appearing, toxic-appearing or diaphoretic. HENT:      Head: Normocephalic and atraumatic. Right Ear: External ear normal.      Left Ear: External ear normal.      Nose: Nose normal. No congestion or rhinorrhea. Eyes:      Extraocular Movements: Extraocular movements intact. Conjunctiva/sclera: Conjunctivae normal.      Pupils: Pupils are equal, round, and reactive to light. Cardiovascular:      Rate and Rhythm: Normal rate and regular rhythm. Pulses: Normal pulses. Heart sounds: Normal heart sounds. No murmur heard. Pulmonary:      Effort: Pulmonary effort is normal. No respiratory distress. Breath sounds: Normal breath sounds. No stridor. No wheezing, rhonchi or rales. Chest:      Chest wall: No tenderness. Musculoskeletal:         General: Normal range of motion. Cervical back: Normal range of motion and neck supple. No tenderness. Right lower leg: No edema. Left lower leg: No edema. Lymphadenopathy:      Cervical: No cervical adenopathy. Skin:     General: Skin is warm. Capillary Refill: Capillary refill takes less than 2 seconds. Coloration: Skin is not jaundiced. Findings: No erythema or lesion. Neurological:      General: No focal deficit present. Mental Status: She is alert and oriented to person, place, and time. Mental status is at baseline. Cranial Nerves: No cranial nerve deficit. Coordination: Coordination normal.      Gait: Gait normal.   Psychiatric:         Mood and Affect: Mood normal.         Behavior: Behavior normal.         Thought Content: Thought content normal.         Judgment: Judgment normal.       Assessment& Plan     Diagnosis Orders   1. Anxiety  hydrOXYzine HCl (ATARAX) 25 MG tablet      2. Cervical cancer screening  [place] [id number], [facility], [address] [facility]      3. Nausea  ondansetron (ZOFRAN-ODT) 4 MG disintegrating tablet        Hydroxyzine PRN for anxiety. Discussed risk of drowsiness. Referral for pap. Will schedule with counseling. F/u in 6 months or sooner PRN. Side effects, adverse effects of the medication prescribed today, as well as treatment plan/ rationale and result expectations have been discussed with the patient who expresses understanding and desires to proceed. Close follow up to evaluate treatment results and for coordination of care. I have reviewed the patient's medical history in detail and updated the computerized patient record. As always, patient is advised that if symptoms worsen in any way they will proceed to the nearest emergency room.        Orders Placed This Encounter   Procedures    Belem Munroe CNM, OB-GYN, [place]     Referral Priority:   Routine     Referral Type:   Eval and Treat     Referral Reason:   Specialty Services Required     Referred to Provider:   DOMONIQUE Ibarra CNM     Requested Specialty:   Certified Nurse Midwife     Number of Visits Requested:   1       Orders Placed This Encounter   Medications    hydrOXYzine HCl (ATARAX) 25 MG tablet     Sig: Take 1 tablet by mouth every 8 hours as needed for Anxiety     Dispense:  30 tablet     Refill:  0    ondansetron (ZOFRAN-ODT) 4 MG disintegrating tablet     Sig: Take 1 tablet by mouth 3 times daily as needed for Nausea or Vomiting     Dispense:  10 tablet     Refill:  0       There are no discontinued medications. Return in about 6 months (around 3/8/2023) for anxiety.     Yue Og, APRN - CNP

## 2023-03-09 ENCOUNTER — OFFICE VISIT (OUTPATIENT)
Dept: FAMILY MEDICINE CLINIC | Age: 23
End: 2023-03-09
Payer: COMMERCIAL

## 2023-03-09 VITALS
HEART RATE: 79 BPM | WEIGHT: 156 LBS | SYSTOLIC BLOOD PRESSURE: 98 MMHG | OXYGEN SATURATION: 97 % | HEIGHT: 64 IN | BODY MASS INDEX: 26.63 KG/M2 | TEMPERATURE: 97.8 F | DIASTOLIC BLOOD PRESSURE: 62 MMHG

## 2023-03-09 DIAGNOSIS — F41.9 ANXIETY: Primary | ICD-10-CM

## 2023-03-09 DIAGNOSIS — Z12.4 CERVICAL CANCER SCREENING: ICD-10-CM

## 2023-03-09 PROCEDURE — 1036F TOBACCO NON-USER: CPT | Performed by: NURSE PRACTITIONER

## 2023-03-09 PROCEDURE — G8427 DOCREV CUR MEDS BY ELIG CLIN: HCPCS | Performed by: NURSE PRACTITIONER

## 2023-03-09 PROCEDURE — 99213 OFFICE O/P EST LOW 20 MIN: CPT | Performed by: NURSE PRACTITIONER

## 2023-03-09 PROCEDURE — G8484 FLU IMMUNIZE NO ADMIN: HCPCS | Performed by: NURSE PRACTITIONER

## 2023-03-09 PROCEDURE — G8419 CALC BMI OUT NRM PARAM NOF/U: HCPCS | Performed by: NURSE PRACTITIONER

## 2023-03-09 SDOH — ECONOMIC STABILITY: FOOD INSECURITY: WITHIN THE PAST 12 MONTHS, YOU WORRIED THAT YOUR FOOD WOULD RUN OUT BEFORE YOU GOT MONEY TO BUY MORE.: NEVER TRUE

## 2023-03-09 SDOH — ECONOMIC STABILITY: INCOME INSECURITY: HOW HARD IS IT FOR YOU TO PAY FOR THE VERY BASICS LIKE FOOD, HOUSING, MEDICAL CARE, AND HEATING?: NOT HARD AT ALL

## 2023-03-09 SDOH — ECONOMIC STABILITY: FOOD INSECURITY: WITHIN THE PAST 12 MONTHS, THE FOOD YOU BOUGHT JUST DIDN'T LAST AND YOU DIDN'T HAVE MONEY TO GET MORE.: NEVER TRUE

## 2023-03-09 SDOH — ECONOMIC STABILITY: HOUSING INSECURITY
IN THE LAST 12 MONTHS, WAS THERE A TIME WHEN YOU DID NOT HAVE A STEADY PLACE TO SLEEP OR SLEPT IN A SHELTER (INCLUDING NOW)?: NO

## 2023-03-09 ASSESSMENT — ENCOUNTER SYMPTOMS
DIARRHEA: 0
BACK PAIN: 0
EYE PAIN: 0
CHEST TIGHTNESS: 0
CONSTIPATION: 0
SHORTNESS OF BREATH: 0
COLOR CHANGE: 0
ABDOMINAL PAIN: 0
COUGH: 0
TROUBLE SWALLOWING: 0

## 2023-03-09 ASSESSMENT — PATIENT HEALTH QUESTIONNAIRE - PHQ9
1. LITTLE INTEREST OR PLEASURE IN DOING THINGS: 0
SUM OF ALL RESPONSES TO PHQ QUESTIONS 1-9: 0
8. MOVING OR SPEAKING SO SLOWLY THAT OTHER PEOPLE COULD HAVE NOTICED. OR THE OPPOSITE, BEING SO FIGETY OR RESTLESS THAT YOU HAVE BEEN MOVING AROUND A LOT MORE THAN USUAL: 0
SUM OF ALL RESPONSES TO PHQ9 QUESTIONS 1 & 2: 0
5. POOR APPETITE OR OVEREATING: 0
6. FEELING BAD ABOUT YOURSELF - OR THAT YOU ARE A FAILURE OR HAVE LET YOURSELF OR YOUR FAMILY DOWN: 0
7. TROUBLE CONCENTRATING ON THINGS, SUCH AS READING THE NEWSPAPER OR WATCHING TELEVISION: 0
9. THOUGHTS THAT YOU WOULD BE BETTER OFF DEAD, OR OF HURTING YOURSELF: 0
10. IF YOU CHECKED OFF ANY PROBLEMS, HOW DIFFICULT HAVE THESE PROBLEMS MADE IT FOR YOU TO DO YOUR WORK, TAKE CARE OF THINGS AT HOME, OR GET ALONG WITH OTHER PEOPLE: 0
2. FEELING DOWN, DEPRESSED OR HOPELESS: 0
SUM OF ALL RESPONSES TO PHQ QUESTIONS 1-9: 0
4. FEELING TIRED OR HAVING LITTLE ENERGY: 0
3. TROUBLE FALLING OR STAYING ASLEEP: 0

## 2023-03-09 NOTE — PROGRESS NOTES
Subjective  Chief Complaint   Patient presents with    6 Month Follow-Up    Anxiety       HPI    Pt here for 6 month follow up on anxiety. Anxiety-doing very well. No issues with anxiety or depression. She is continuing to follow with counseling, going every 2 weeks. Due for pap. Past Medical History:   Diagnosis Date    Anxiety     Gastritis      There are no problems to display for this patient. Past Surgical History:   Procedure Laterality Date    UPPER GASTROINTESTINAL ENDOSCOPY  2018     No family history on file. Social History     Socioeconomic History    Marital status: Single     Spouse name: None    Number of children: None    Years of education: None    Highest education level: None   Tobacco Use    Smoking status: Never    Smokeless tobacco: Never   Substance and Sexual Activity    Alcohol use: No    Drug use: No    Sexual activity: Never     Social Determinants of Health     Financial Resource Strain: Low Risk     Difficulty of Paying Living Expenses: Not hard at all   Food Insecurity: No Food Insecurity    Worried About [address] [facility] in the Last Year: Never true    Ran Out of Food in the Last Year: Never true   Transportation Needs: Unknown    Lack of Transportation (Non-Medical): No   Housing Stability: Unknown    Unstable Housing in the Last Year: No     Current Outpatient Medications on File Prior to Visit   Medication Sig Dispense Refill    ondansetron (ZOFRAN-ODT) 4 MG disintegrating tablet Take 1 tablet by mouth 3 times daily as needed for Nausea or Vomiting 10 tablet 0     No current facility-administered medications on file prior to visit. No Known Allergies    Review of Systems   Constitutional:  Negative for activity change, appetite change, chills, diaphoresis, fatigue and fever. HENT:  Negative for congestion, ear pain, hearing loss and trouble swallowing. Eyes:  Negative for pain and visual disturbance.    Respiratory:  Negative for cough, chest tightness and shortness of breath. Cardiovascular:  Negative for chest pain, palpitations and leg swelling. Gastrointestinal:  Negative for abdominal pain, constipation and diarrhea. Endocrine: Negative for polydipsia, polyphagia and polyuria. Genitourinary:  Negative for difficulty urinating and dysuria. Musculoskeletal:  Negative for arthralgias and back pain. Skin:  Negative for color change and rash. Neurological:  Negative for dizziness and light-headedness. Psychiatric/Behavioral:  Negative for dysphoric mood. The patient is not nervous/anxious. Objective  Vitals:    03/09/23 0930   BP: 98/62   Pulse: 79   Temp: 97.8 °F (36.6 °C)   SpO2: 97%   Weight: 156 lb (70.8 kg)   Height: 5' 4\" (1.626 m)     Physical Exam  Vitals reviewed. Constitutional:       General: She is not in acute distress. Appearance: Normal appearance. She is well-developed and normal weight. She is not ill-appearing, toxic-appearing or diaphoretic. HENT:      Head: Normocephalic and atraumatic. Right Ear: Hearing, tympanic membrane, ear canal and external ear normal. There is no impacted cerumen. Left Ear: Hearing, tympanic membrane, ear canal and external ear normal. There is no impacted cerumen. Nose: Nose normal. No congestion or rhinorrhea. Mouth/Throat:      Mouth: Mucous membranes are moist.      Pharynx: Oropharynx is clear. No oropharyngeal exudate or posterior oropharyngeal erythema. Eyes:      General:         Right eye: No discharge. Left eye: No discharge. Conjunctiva/sclera: Conjunctivae normal.      Pupils: Pupils are equal, round, and reactive to light. Neck:      Thyroid: No thyromegaly. Cardiovascular:      Rate and Rhythm: Normal rate and regular rhythm. Pulses: Normal pulses. Heart sounds: Normal heart sounds. No murmur heard. Pulmonary:      Effort: Pulmonary effort is normal. No respiratory distress. Breath sounds: Normal breath sounds. No stridor.  No wheezing, rhonchi or rales. Chest:      Chest wall: No tenderness. Musculoskeletal:         General: Normal range of motion. Cervical back: Neck supple. No tenderness. Right lower leg: No edema. Left lower leg: No edema. Lymphadenopathy:      Cervical: No cervical adenopathy. Skin:     General: Skin is warm and dry. Capillary Refill: Capillary refill takes less than 2 seconds. Coloration: Skin is not jaundiced or pale. Findings: No bruising, erythema, lesion or rash. Neurological:      General: No focal deficit present. Mental Status: She is alert and oriented to person, place, and time. Mental status is at baseline. Cranial Nerves: No cranial nerve deficit. Coordination: Coordination normal.      Gait: Gait normal.   Psychiatric:         Mood and Affect: Mood normal.         Speech: Speech normal.         Behavior: Behavior normal.         Thought Content: Thought content normal.         Judgment: Judgment normal.       Assessment& Plan     Diagnosis Orders   1. Anxiety        2. Cervical cancer screening  Belem Marie CNM, OB-GYN, [place]        Doing very well overall. Continue to follow with counseling. Referral for pap. F/u annually and PRN>  Side effects, adverse effects of the medication prescribed today, as well as treatment plan/ rationale and result expectations have been discussed with the patient who expresses understanding and desires to proceed. Close follow up to evaluate treatment results and for coordination of care. I have reviewed the patient's medical history in detail and updated the computerized patient record. As always, patient is advised that if symptoms worsen in any way they will proceed to the nearest emergency room.        Orders Placed This Encounter   Procedures    Belem Marie CNM, OB-GYN, [place]     Referral Priority:   Routine     Referral Type:   Eval and Treat     Referral Reason: Specialty Services Required     Referred to Provider:   DOMONIQUE Mueller CNM     Requested Specialty:   Certified Nurse Midwife     Number of Visits Requested:   1       No orders of the defined types were placed in this encounter. There are no discontinued medications.     Return in about 1 year (around 3/9/2024) for annual exam.    DOMONIQUE Gurrola - CNP

## 2023-04-01 ENCOUNTER — OFFICE VISIT (OUTPATIENT)
Dept: OBGYN CLINIC | Age: 23
End: 2023-04-01

## 2023-04-01 VITALS
BODY MASS INDEX: 26.78 KG/M2 | DIASTOLIC BLOOD PRESSURE: 76 MMHG | WEIGHT: 156 LBS | HEART RATE: 88 BPM | SYSTOLIC BLOOD PRESSURE: 124 MMHG

## 2023-04-01 DIAGNOSIS — Z12.4 ENCOUNTER FOR PAP SMEAR OF CERVIX WITH HPV DNA COTESTING: ICD-10-CM

## 2023-04-01 DIAGNOSIS — Z20.2 POSSIBLE EXPOSURE TO STD: ICD-10-CM

## 2023-04-01 DIAGNOSIS — Z01.419 WOMEN'S ANNUAL ROUTINE GYNECOLOGICAL EXAMINATION: Primary | ICD-10-CM

## 2023-04-01 ASSESSMENT — ENCOUNTER SYMPTOMS
COUGH: 0
CONSTIPATION: 0
RHINORRHEA: 0
TROUBLE SWALLOWING: 0
VOICE CHANGE: 0
SORE THROAT: 0
SHORTNESS OF BREATH: 0
VOMITING: 0
ABDOMINAL PAIN: 0
DIARRHEA: 0
NAUSEA: 0

## 2023-04-01 NOTE — PROGRESS NOTES
Chief Complaint:     Purnima Jasso is a 25 y.o. female who presents here today for complaints of:      Chief Complaint   Patient presents with    Annual Exam     Pt is here for an annual exam, she does need a pap due to no prior hx. History of Present Illness:     Purnima Jasso is a 25 y.o. female who presents for her annual exam.    Concerns Today:    Doing well, no concerns today. Past Medical History: Allergies:  Patient has no known allergies. No LMP recorded. Obstetrical History:  No obstetric history on file. Past Medical History:   Diagnosis Date    Anxiety     Gastritis      Medications:     Current Outpatient Medications on File Prior to Visit   Medication Sig Dispense Refill    ondansetron (ZOFRAN-ODT) 4 MG disintegrating tablet Take 1 tablet by mouth 3 times daily as needed for Nausea or Vomiting 10 tablet 0     No current facility-administered medications on file prior to visit. Review of Systems:     Review of Systems   Constitutional:  Negative for activity change, appetite change, chills, diaphoresis, fatigue, fever and unexpected weight change. HENT:  Negative for congestion, postnasal drip, rhinorrhea, sneezing, sore throat, trouble swallowing and voice change. Respiratory:  Negative for cough and shortness of breath. Cardiovascular:  Negative for chest pain. Gastrointestinal:  Negative for abdominal pain, constipation, diarrhea, nausea and vomiting. Genitourinary:  Negative for difficulty urinating, dyspareunia, dysuria, frequency, genital sores, menstrual problem, pelvic pain, vaginal bleeding, vaginal discharge and vaginal pain. Musculoskeletal:  Negative for arthralgias and myalgias. Neurological:  Negative for dizziness, syncope and headaches. Hematological:  Negative for adenopathy. All other systems reviewed and are negative.     Physical Exam:     Vitals:  /76   Pulse 88   Wt 156 lb (70.8 kg)   BMI 26.78 kg/m²     Prior Pap History:  No

## 2023-04-02 LAB
CLUE CELLS VAG QL WET PREP: NORMAL
T VAGINALIS VAG QL WET PREP: NORMAL
TRICHOMONAS VAGINALIS SCREEN: NEGATIVE
YEAST VAG QL WET PREP: NORMAL

## 2023-04-06 LAB
C TRACH DNA CVX QL NAA+PROBE: NEGATIVE
N GONORRHOEA DNA SPEC QL NAA+PROBE: NEGATIVE

## 2023-04-07 ENCOUNTER — TELEPHONE (OUTPATIENT)
Dept: OBGYN CLINIC | Age: 23
End: 2023-04-07

## 2023-04-07 LAB
HPV HR 12 DNA SPEC QL NAA+PROBE: DETECTED
HPV16 DNA SPEC QL NAA+PROBE: NOT DETECTED
HPV16+18+H RISK 12 DNA SPEC-IMP: ABNORMAL
HPV18 DNA SPEC QL NAA+PROBE: NOT DETECTED

## 2023-08-11 ENCOUNTER — OFFICE VISIT (OUTPATIENT)
Dept: FAMILY MEDICINE CLINIC | Age: 23
End: 2023-08-11
Payer: COMMERCIAL

## 2023-08-11 VITALS
HEART RATE: 62 BPM | DIASTOLIC BLOOD PRESSURE: 64 MMHG | BODY MASS INDEX: 25.16 KG/M2 | HEIGHT: 64 IN | SYSTOLIC BLOOD PRESSURE: 100 MMHG | WEIGHT: 147.4 LBS | TEMPERATURE: 98.4 F | OXYGEN SATURATION: 98 %

## 2023-08-11 DIAGNOSIS — F41.1 GENERALIZED ANXIETY DISORDER: ICD-10-CM

## 2023-08-11 PROCEDURE — G8419 CALC BMI OUT NRM PARAM NOF/U: HCPCS | Performed by: NURSE PRACTITIONER

## 2023-08-11 PROCEDURE — 99214 OFFICE O/P EST MOD 30 MIN: CPT | Performed by: NURSE PRACTITIONER

## 2023-08-11 PROCEDURE — G8427 DOCREV CUR MEDS BY ELIG CLIN: HCPCS | Performed by: NURSE PRACTITIONER

## 2023-08-11 PROCEDURE — 1036F TOBACCO NON-USER: CPT | Performed by: NURSE PRACTITIONER

## 2023-08-11 RX ORDER — BUSPIRONE HYDROCHLORIDE 5 MG/1
5 TABLET ORAL 2 TIMES DAILY
Qty: 60 TABLET | Refills: 2 | Status: SHIPPED | OUTPATIENT
Start: 2023-08-11

## 2023-08-11 ASSESSMENT — ENCOUNTER SYMPTOMS
ABDOMINAL PAIN: 0
COLOR CHANGE: 0
TROUBLE SWALLOWING: 0
BACK PAIN: 0
CHEST TIGHTNESS: 0
SHORTNESS OF BREATH: 0
CONSTIPATION: 0
EYE PAIN: 0
COUGH: 0
DIARRHEA: 0

## 2023-08-11 NOTE — PROGRESS NOTES
Subjective  Chief Complaint   Patient presents with    Anxiety     X 5 month check up    Discuss Medications       HPI    Pt here for check up. Feels over the past couple months has worsened, specifically worse around her period. Occasional panic attacks. The week before and the week of her period. No longer following with counseling. Does not feel she is having issues with depression. Past Medical History:   Diagnosis Date    Anxiety     Gastritis      There are no problems to display for this patient. Past Surgical History:   Procedure Laterality Date    UPPER GASTROINTESTINAL ENDOSCOPY  2018     No family history on file. Social History     Socioeconomic History    Marital status: Single     Spouse name: None    Number of children: None    Years of education: None    Highest education level: None   Tobacco Use    Smoking status: Never    Smokeless tobacco: Never   Substance and Sexual Activity    Alcohol use: No    Drug use: No    Sexual activity: Never     Social Determinants of Health     Financial Resource Strain: Low Risk     Difficulty of Paying Living Expenses: Not hard at all   Food Insecurity: No Food Insecurity    Worried About [place] in the Last Year: Never true    Ran Out of Food in the Last Year: Never true   Transportation Needs: Unknown    Lack of Transportation (Non-Medical): No   Housing Stability: Unknown    Unstable Housing in the Last Year: No     Current Outpatient Medications on File Prior to Visit   Medication Sig Dispense Refill    ondansetron (ZOFRAN-ODT) 4 MG disintegrating tablet Take 1 tablet by mouth 3 times daily as needed for Nausea or Vomiting 10 tablet 0     No current facility-administered medications on file prior to visit. No Known Allergies    Review of Systems   Constitutional:  Negative for activity change, appetite change, chills, diaphoresis, fatigue and fever. HENT:  Negative for congestion, ear pain, hearing loss and trouble swallowing.

## 2023-09-12 ENCOUNTER — OFFICE VISIT (OUTPATIENT)
Dept: FAMILY MEDICINE CLINIC | Age: 23
End: 2023-09-12
Payer: COMMERCIAL

## 2023-09-12 VITALS
OXYGEN SATURATION: 97 % | HEART RATE: 87 BPM | WEIGHT: 153.2 LBS | BODY MASS INDEX: 26.15 KG/M2 | DIASTOLIC BLOOD PRESSURE: 78 MMHG | HEIGHT: 64 IN | SYSTOLIC BLOOD PRESSURE: 100 MMHG

## 2023-09-12 DIAGNOSIS — F41.1 GENERALIZED ANXIETY DISORDER: Primary | ICD-10-CM

## 2023-09-12 PROCEDURE — 1036F TOBACCO NON-USER: CPT | Performed by: NURSE PRACTITIONER

## 2023-09-12 PROCEDURE — G8427 DOCREV CUR MEDS BY ELIG CLIN: HCPCS | Performed by: NURSE PRACTITIONER

## 2023-09-12 PROCEDURE — G8419 CALC BMI OUT NRM PARAM NOF/U: HCPCS | Performed by: NURSE PRACTITIONER

## 2023-09-12 PROCEDURE — 99213 OFFICE O/P EST LOW 20 MIN: CPT | Performed by: NURSE PRACTITIONER

## 2023-09-12 ASSESSMENT — ENCOUNTER SYMPTOMS
DIARRHEA: 0
CHEST TIGHTNESS: 0
TROUBLE SWALLOWING: 0
CONSTIPATION: 0
EYE PAIN: 0
COLOR CHANGE: 0
ABDOMINAL PAIN: 0
BACK PAIN: 0
COUGH: 0
SHORTNESS OF BREATH: 0

## 2023-09-12 NOTE — PROGRESS NOTES
Subjective  Chief Complaint   Patient presents with    Anxiety     Check up. Pt states she is feeling better. HPI    Pt here for follow up on anxiety. Started on buspar PRN at last visit for anxiety/panic attacks. Has taken approx 1 time per week, feels it helps. Did take 2 yesterday and that helped. No side effects. No other issues or concerns. Past Medical History:   Diagnosis Date    Anxiety     Gastritis      There are no problems to display for this patient. Past Surgical History:   Procedure Laterality Date    UPPER GASTROINTESTINAL ENDOSCOPY  2018     History reviewed. No pertinent family history. Social History     Socioeconomic History    Marital status: Single     Spouse name: None    Number of children: None    Years of education: None    Highest education level: None   Tobacco Use    Smoking status: Never    Smokeless tobacco: Never   Substance and Sexual Activity    Alcohol use: No    Drug use: No    Sexual activity: Not Currently     Partners: Male     Social Determinants of Health     Financial Resource Strain: Low Risk  (3/9/2023)    Overall Financial Resource Strain (CARDIA)     Difficulty of Paying Living Expenses: Not hard at all   Food Insecurity: No Food Insecurity (3/9/2023)    Hunger Vital Sign     Worried About Running Out of Food in the Last Year: Never true     [address] in the Last Year: Never true   Transportation Needs: Unknown (3/9/2023)    PRAPARE - Transportation     Lack of Transportation (Non-Medical): No   Housing Stability: Unknown (3/9/2023)    Housing Stability Vital Sign     Unstable Housing in the Last Year: No     Current Outpatient Medications on File Prior to Visit   Medication Sig Dispense Refill    busPIRone (BUSPAR) 5 MG tablet Take 1 tablet by mouth 2 times daily 60 tablet 2     No current facility-administered medications on file prior to visit.      No Known Allergies    Review of Systems   Constitutional:  Negative for activity change, appetite

## 2023-11-29 ENCOUNTER — OFFICE VISIT (OUTPATIENT)
Dept: FAMILY MEDICINE CLINIC | Age: 23
End: 2023-11-29
Payer: COMMERCIAL

## 2023-11-29 VITALS
DIASTOLIC BLOOD PRESSURE: 62 MMHG | HEIGHT: 64 IN | OXYGEN SATURATION: 98 % | SYSTOLIC BLOOD PRESSURE: 96 MMHG | WEIGHT: 153 LBS | BODY MASS INDEX: 26.12 KG/M2 | HEART RATE: 79 BPM

## 2023-11-29 DIAGNOSIS — G89.29 CHRONIC MIDLINE THORACIC BACK PAIN: ICD-10-CM

## 2023-11-29 DIAGNOSIS — H66.003 NON-RECURRENT ACUTE SUPPURATIVE OTITIS MEDIA OF BOTH EARS WITHOUT SPONTANEOUS RUPTURE OF TYMPANIC MEMBRANES: Primary | ICD-10-CM

## 2023-11-29 DIAGNOSIS — M54.6 CHRONIC MIDLINE THORACIC BACK PAIN: ICD-10-CM

## 2023-11-29 PROCEDURE — G8419 CALC BMI OUT NRM PARAM NOF/U: HCPCS | Performed by: NURSE PRACTITIONER

## 2023-11-29 PROCEDURE — 99214 OFFICE O/P EST MOD 30 MIN: CPT | Performed by: NURSE PRACTITIONER

## 2023-11-29 PROCEDURE — G8484 FLU IMMUNIZE NO ADMIN: HCPCS | Performed by: NURSE PRACTITIONER

## 2023-11-29 PROCEDURE — G8427 DOCREV CUR MEDS BY ELIG CLIN: HCPCS | Performed by: NURSE PRACTITIONER

## 2023-11-29 PROCEDURE — 1036F TOBACCO NON-USER: CPT | Performed by: NURSE PRACTITIONER

## 2023-11-29 RX ORDER — NAPROXEN 500 MG/1
500 TABLET ORAL 2 TIMES DAILY WITH MEALS
Qty: 30 TABLET | Refills: 0 | Status: SHIPPED | OUTPATIENT
Start: 2023-11-29

## 2023-11-29 RX ORDER — AMOXICILLIN 875 MG/1
875 TABLET, COATED ORAL 2 TIMES DAILY
Qty: 20 TABLET | Refills: 0 | Status: SHIPPED | OUTPATIENT
Start: 2023-11-29 | End: 2023-12-09

## 2023-11-29 ASSESSMENT — ENCOUNTER SYMPTOMS
DIARRHEA: 0
EYE PAIN: 0
BACK PAIN: 1
COLOR CHANGE: 0
TROUBLE SWALLOWING: 0
CONSTIPATION: 0
COUGH: 0
SHORTNESS OF BREATH: 0
ABDOMINAL PAIN: 0
CHEST TIGHTNESS: 0

## 2023-11-29 NOTE — PROGRESS NOTES
Subjective  Chief Complaint   Patient presents with    Otalgia     States that she has bilateral otalgia that started over a week ago. Back Pain     States that she is having mid back pain for almost a few months. States that she works out a lot and might be from that. Otalgia   There is pain in both ears. This is a new problem. The current episode started 1 to 4 weeks ago (1 1/2 weeks ago). The problem has been gradually worsening. There has been no fever. Pertinent negatives include no abdominal pain, coughing, diarrhea, hearing loss or rash. Associated symptoms comments: Ear fullness and pain. Treatments tried: tylenol cold and flu. The treatment provided no relief. Back Pain  This is a new problem. The current episode started more than 1 month ago (2 months). The problem occurs constantly. The pain is present in the thoracic spine. Quality: sharp. The pain does not radiate. The symptoms are aggravated by sitting. Pertinent negatives include no abdominal pain, chest pain, dysuria or fever. She has tried NSAIDs for the symptoms. The treatment provided no relief. Past Medical History:   Diagnosis Date    Anxiety     Gastritis      There are no problems to display for this patient. Past Surgical History:   Procedure Laterality Date    UPPER GASTROINTESTINAL ENDOSCOPY  2018     No family history on file.   Social History     Socioeconomic History    Marital status: Single     Spouse name: None    Number of children: None    Years of education: None    Highest education level: None   Tobacco Use    Smoking status: Never    Smokeless tobacco: Never   Substance and Sexual Activity    Alcohol use: No    Drug use: No    Sexual activity: Not Currently     Partners: Male     Social Determinants of Health     Financial Resource Strain: Low Risk  (3/9/2023)    Overall Financial Resource Strain (CARDIA)     Difficulty of Paying Living Expenses: Not hard at all   Food Insecurity: No Food Insecurity (3/9/2023)

## 2023-12-04 ENCOUNTER — TELEPHONE (OUTPATIENT)
Dept: FAMILY MEDICINE CLINIC | Age: 23
End: 2023-12-04

## 2023-12-04 NOTE — TELEPHONE ENCOUNTER
Pts mom called in today. States that pt is still having issues with her ear infection from last week. States that she can see blood in her ear with a camera that she has to look in the ear. Wants to know if pt can fly. She is on her way to the airport now to fly to [place]. States that she thinks maybe her eardrum has ruptured. Spoke with Dr. Corinne Patten and she states that she is not guaranteeing that pt would be safe to fly and that she should be seen in the ER. Mom states that she understands.

## 2024-03-12 ENCOUNTER — OFFICE VISIT (OUTPATIENT)
Dept: FAMILY MEDICINE CLINIC | Age: 24
End: 2024-03-12
Payer: COMMERCIAL

## 2024-03-12 VITALS
BODY MASS INDEX: 26.29 KG/M2 | OXYGEN SATURATION: 96 % | SYSTOLIC BLOOD PRESSURE: 102 MMHG | DIASTOLIC BLOOD PRESSURE: 72 MMHG | HEIGHT: 64 IN | WEIGHT: 154 LBS | HEART RATE: 89 BPM

## 2024-03-12 DIAGNOSIS — F41.1 GENERALIZED ANXIETY DISORDER: Primary | ICD-10-CM

## 2024-03-12 PROCEDURE — G8419 CALC BMI OUT NRM PARAM NOF/U: HCPCS | Performed by: NURSE PRACTITIONER

## 2024-03-12 PROCEDURE — 1036F TOBACCO NON-USER: CPT | Performed by: NURSE PRACTITIONER

## 2024-03-12 PROCEDURE — G8427 DOCREV CUR MEDS BY ELIG CLIN: HCPCS | Performed by: NURSE PRACTITIONER

## 2024-03-12 PROCEDURE — 99213 OFFICE O/P EST LOW 20 MIN: CPT | Performed by: NURSE PRACTITIONER

## 2024-03-12 PROCEDURE — G8484 FLU IMMUNIZE NO ADMIN: HCPCS | Performed by: NURSE PRACTITIONER

## 2024-03-12 RX ORDER — VENLAFAXINE HYDROCHLORIDE 37.5 MG/1
37.5 CAPSULE, EXTENDED RELEASE ORAL DAILY
Qty: 30 CAPSULE | Refills: 3 | Status: SHIPPED | OUTPATIENT
Start: 2024-03-12

## 2024-03-12 SDOH — ECONOMIC STABILITY: FOOD INSECURITY: WITHIN THE PAST 12 MONTHS, THE FOOD YOU BOUGHT JUST DIDN'T LAST AND YOU DIDN'T HAVE MONEY TO GET MORE.: NEVER TRUE

## 2024-03-12 SDOH — ECONOMIC STABILITY: INCOME INSECURITY: HOW HARD IS IT FOR YOU TO PAY FOR THE VERY BASICS LIKE FOOD, HOUSING, MEDICAL CARE, AND HEATING?: NOT HARD AT ALL

## 2024-03-12 SDOH — ECONOMIC STABILITY: FOOD INSECURITY: WITHIN THE PAST 12 MONTHS, YOU WORRIED THAT YOUR FOOD WOULD RUN OUT BEFORE YOU GOT MONEY TO BUY MORE.: NEVER TRUE

## 2024-03-12 ASSESSMENT — PATIENT HEALTH QUESTIONNAIRE - PHQ9
2. FEELING DOWN, DEPRESSED OR HOPELESS: 0
SUM OF ALL RESPONSES TO PHQ QUESTIONS 1-9: 0
1. LITTLE INTEREST OR PLEASURE IN DOING THINGS: 0
SUM OF ALL RESPONSES TO PHQ QUESTIONS 1-9: 0
SUM OF ALL RESPONSES TO PHQ QUESTIONS 1-9: 0
SUM OF ALL RESPONSES TO PHQ9 QUESTIONS 1 & 2: 0
SUM OF ALL RESPONSES TO PHQ QUESTIONS 1-9: 0

## 2024-03-12 ASSESSMENT — ENCOUNTER SYMPTOMS
COUGH: 0
EYE PAIN: 0
CHEST TIGHTNESS: 0
COLOR CHANGE: 0
SHORTNESS OF BREATH: 0
CONSTIPATION: 0
TROUBLE SWALLOWING: 0
DIARRHEA: 0
BACK PAIN: 0
ABDOMINAL PAIN: 0

## 2024-03-12 NOTE — PROGRESS NOTES
Subjective  Chief Complaint   Patient presents with    Anxiety     6 month check up, states that anxiety is worsening recently and would like to discuss a med that she can take everyday.       HPI    Pt here to discuss anxiety.    Worsening. Taking buspar PRN, but no longer effective. Was on effexor previously and it worked well for her then.     Past Medical History:   Diagnosis Date    Anxiety     Gastritis      There are no problems to display for this patient.    Past Surgical History:   Procedure Laterality Date    UPPER GASTROINTESTINAL ENDOSCOPY  2018     No family history on file.  Social History     Socioeconomic History    Marital status: Single     Spouse name: None    Number of children: None    Years of education: None    Highest education level: None   Tobacco Use    Smoking status: Never    Smokeless tobacco: Never   Substance and Sexual Activity    Alcohol use: No    Drug use: No    Sexual activity: Not Currently     Partners: Male     Social Determinants of Health     Financial Resource Strain: Low Risk  (3/12/2024)    Overall Financial Resource Strain (CARDIA)     Difficulty of Paying Living Expenses: Not hard at all   Food Insecurity: No Food Insecurity (3/12/2024)    Hunger Vital Sign     Worried About Running Out of Food in the Last Year: Never true     Ran Out of Food in the Last Year: Never true   Transportation Needs: Unknown (3/12/2024)    PRAPARE - Transportation     Lack of Transportation (Non-Medical): No   Housing Stability: Unknown (3/12/2024)    Housing Stability Vital Sign     Unstable Housing in the Last Year: No     Current Outpatient Medications on File Prior to Visit   Medication Sig Dispense Refill    naproxen (NAPROSYN) 500 MG tablet Take 1 tablet by mouth 2 times daily (with meals) 30 tablet 0    busPIRone (BUSPAR) 5 MG tablet Take 1 tablet by mouth 2 times daily 60 tablet 2     No current facility-administered medications on file prior to visit.     No Known

## 2024-04-11 ENCOUNTER — OFFICE VISIT (OUTPATIENT)
Dept: FAMILY MEDICINE CLINIC | Age: 24
End: 2024-04-11
Payer: COMMERCIAL

## 2024-04-11 VITALS
HEIGHT: 64 IN | OXYGEN SATURATION: 98 % | SYSTOLIC BLOOD PRESSURE: 94 MMHG | BODY MASS INDEX: 25.61 KG/M2 | HEART RATE: 85 BPM | DIASTOLIC BLOOD PRESSURE: 58 MMHG | WEIGHT: 150 LBS

## 2024-04-11 DIAGNOSIS — F41.1 GENERALIZED ANXIETY DISORDER: ICD-10-CM

## 2024-04-11 PROCEDURE — 1036F TOBACCO NON-USER: CPT | Performed by: NURSE PRACTITIONER

## 2024-04-11 PROCEDURE — G8427 DOCREV CUR MEDS BY ELIG CLIN: HCPCS | Performed by: NURSE PRACTITIONER

## 2024-04-11 PROCEDURE — G8419 CALC BMI OUT NRM PARAM NOF/U: HCPCS | Performed by: NURSE PRACTITIONER

## 2024-04-11 PROCEDURE — 99213 OFFICE O/P EST LOW 20 MIN: CPT | Performed by: NURSE PRACTITIONER

## 2024-04-11 RX ORDER — VENLAFAXINE HYDROCHLORIDE 37.5 MG/1
37.5 CAPSULE, EXTENDED RELEASE ORAL DAILY
Qty: 30 CAPSULE | Refills: 3 | Status: CANCELLED | OUTPATIENT
Start: 2024-04-11

## 2024-04-11 RX ORDER — VENLAFAXINE HYDROCHLORIDE 75 MG/1
75 CAPSULE, EXTENDED RELEASE ORAL DAILY
Qty: 30 CAPSULE | Refills: 3 | Status: SHIPPED | OUTPATIENT
Start: 2024-04-11

## 2024-04-11 ASSESSMENT — ENCOUNTER SYMPTOMS
CHEST TIGHTNESS: 0
COUGH: 0
EYE PAIN: 0
ABDOMINAL PAIN: 0
TROUBLE SWALLOWING: 0
COLOR CHANGE: 0
BACK PAIN: 0
CONSTIPATION: 0
SHORTNESS OF BREATH: 0

## 2024-04-11 NOTE — PROGRESS NOTES
Subjective  Chief Complaint   Patient presents with    Anxiety     1 month f/u after starting effexor. States that she is doing well and can tell a difference that she is taking it.       HPI    Pt here for follow up on anxiety.    Started on effexor at last visit. Feels it is helping significantly; however does not feel she is quite where she would like to be. Does still have anxiety several days per week that is difficulty to manage. No side effects.    Past Medical History:   Diagnosis Date    Anxiety     Gastritis      There are no problems to display for this patient.    Past Surgical History:   Procedure Laterality Date    UPPER GASTROINTESTINAL ENDOSCOPY  2018     No family history on file.  Social History     Socioeconomic History    Marital status: Single     Spouse name: None    Number of children: None    Years of education: None    Highest education level: None   Tobacco Use    Smoking status: Never    Smokeless tobacco: Never   Substance and Sexual Activity    Alcohol use: No    Drug use: No    Sexual activity: Not Currently     Partners: Male     Social Determinants of Health     Financial Resource Strain: Low Risk  (3/12/2024)    Overall Financial Resource Strain (CARDIA)     Difficulty of Paying Living Expenses: Not hard at all   Food Insecurity: No Food Insecurity (3/12/2024)    Hunger Vital Sign     Worried About Running Out of Food in the Last Year: Never true     Ran Out of Food in the Last Year: Never true   Transportation Needs: Unknown (3/12/2024)    PRAPARE - Transportation     Lack of Transportation (Non-Medical): No   Housing Stability: Unknown (3/12/2024)    Housing Stability Vital Sign     Unstable Housing in the Last Year: No     Current Outpatient Medications on File Prior to Visit   Medication Sig Dispense Refill    busPIRone (BUSPAR) 5 MG tablet Take 1 tablet by mouth 2 times daily 60 tablet 2     No current facility-administered medications on file prior to visit.     No Known

## 2024-05-03 ENCOUNTER — OFFICE VISIT (OUTPATIENT)
Dept: OBGYN CLINIC | Age: 24
End: 2024-05-03

## 2024-05-03 VITALS
WEIGHT: 142 LBS | SYSTOLIC BLOOD PRESSURE: 104 MMHG | BODY MASS INDEX: 24.37 KG/M2 | DIASTOLIC BLOOD PRESSURE: 66 MMHG | HEART RATE: 63 BPM

## 2024-05-03 DIAGNOSIS — O21.9 NAUSEA AND VOMITING IN PREGNANCY: ICD-10-CM

## 2024-05-03 DIAGNOSIS — R10.9 ABDOMINAL CRAMPING AFFECTING PREGNANCY: ICD-10-CM

## 2024-05-03 DIAGNOSIS — Z34.91 PRENATAL CARE IN FIRST TRIMESTER: Primary | ICD-10-CM

## 2024-05-03 DIAGNOSIS — O20.0 THREATENED MISCARRIAGE IN EARLY PREGNANCY: ICD-10-CM

## 2024-05-03 DIAGNOSIS — O26.899 ABDOMINAL CRAMPING AFFECTING PREGNANCY: ICD-10-CM

## 2024-05-03 DIAGNOSIS — Z72.51 UNPROTECTED SEXUAL INTERCOURSE: ICD-10-CM

## 2024-05-03 DIAGNOSIS — Z34.91 PRENATAL CARE IN FIRST TRIMESTER: ICD-10-CM

## 2024-05-03 LAB
AMPHET UR QL SCN: NORMAL
BARBITURATES UR QL SCN: NORMAL
BASOPHILS # BLD: 0 K/UL (ref 0–0.2)
BASOPHILS NFR BLD: 0.3 %
BENZODIAZ UR QL SCN: NORMAL
BUPRENORPHINE+NOR UR QL SCN: NORMAL
CANNABINOIDS UR QL SCN: NORMAL
COCAINE UR QL SCN: NORMAL
DRUG SCREEN COMMENT UR-IMP: NORMAL
EOSINOPHIL # BLD: 0.1 K/UL (ref 0–0.7)
EOSINOPHIL NFR BLD: 1.5 %
ERYTHROCYTE [DISTWIDTH] IN BLOOD BY AUTOMATED COUNT: 11.7 % (ref 11.5–14.5)
FENTANYL SCREEN, URINE: NORMAL
GONADOTROPIN, CHORIONIC (HCG) QUANT: 9012 MIU/ML
HBV SURFACE AG SERPL QL IA: NORMAL
HCT VFR BLD AUTO: 32.7 % (ref 37–47)
HGB BLD-MCNC: 11.4 G/DL (ref 12–16)
LYMPHOCYTES # BLD: 2 K/UL (ref 1–4.8)
LYMPHOCYTES NFR BLD: 26.9 %
MCH RBC QN AUTO: 29.5 PG (ref 27–31.3)
MCHC RBC AUTO-ENTMCNC: 34.9 % (ref 33–37)
MCV RBC AUTO: 84.7 FL (ref 79.4–94.8)
METHADONE UR QL SCN: NORMAL
MONOCYTES # BLD: 0.6 K/UL (ref 0.2–0.8)
MONOCYTES NFR BLD: 8.4 %
NEUTROPHILS # BLD: 4.7 K/UL (ref 1.4–6.5)
NEUTS SEG NFR BLD: 62.5 %
OPIATES UR QL SCN: NORMAL
OXYCODONE UR QL SCN: NORMAL
PCP UR QL SCN: NORMAL
PLATELET # BLD AUTO: 329 K/UL (ref 130–400)
PROPOXYPH UR QL SCN: NORMAL
RBC # BLD AUTO: 3.86 M/UL (ref 4.2–5.4)
RUBELLA ANTIBODY IGG: >500 IU/ML
TSH REFLEX: 0.77 UIU/ML (ref 0.44–3.86)
WBC # BLD AUTO: 7.6 K/UL (ref 4.8–10.8)

## 2024-05-03 RX ORDER — ONDANSETRON 4 MG/1
TABLET, ORALLY DISINTEGRATING ORAL
Qty: 30 TABLET | Refills: 2 | Status: SHIPPED | OUTPATIENT
Start: 2024-05-03

## 2024-05-03 RX ORDER — VITAMIN A, VITAMIN C, VITAMIN D, VITAMIN E, THIAMINE, RIBOFLAVIN, NIACIN, VITAMIN B6, FOLIC ACID, VITAMIN B12, CALCIUM, IRON, ZINC, COPPER 4000; 120; 400; 22; 1.84; 3; 20; 10; 1; 12; 200; 27; 25; 2 [IU]/1; MG/1; [IU]/1; [IU]/1; MG/1; MG/1; MG/1; MG/1; MG/1; UG/1; MG/1; MG/1; MG/1; MG/1
1 TABLET ORAL DAILY
Qty: 90 TABLET | Refills: 3 | Status: SHIPPED | OUTPATIENT
Start: 2024-05-03 | End: 2025-05-03

## 2024-05-03 ASSESSMENT — ENCOUNTER SYMPTOMS
NAUSEA: 0
CONSTIPATION: 0
ABDOMINAL PAIN: 0
VOMITING: 0
SHORTNESS OF BREATH: 0
DIARRHEA: 0

## 2024-05-03 NOTE — PROGRESS NOTES
SUBJECTIVE:  Erum Bermudez is a 23 y.o. female who presents here today for complaints of:      Chief Complaint   Patient presents with    ED Follow-up     Seen at ED and UPT came back positive, they could not see anything on US, she still does experience nausea        Amenorrhea, Confirmation of Pregnancy  Home Pregnancy Test:  Positive  Patient's last menstrual period was 2024 (exact date).  Pregnancy Symptoms:  Missed Period, Tender Breasts, Urinary Frequency, Fatigue  Pelvic pain/cramping:   Yes  Vaginal bleeding: No    Abdominal Cramping  Presented to [facility] ER on 24 with lower abdominal and lower back cramping without bleeding.  Still having cramping today, but not quite as severe.    Gynecological History  Prior Pap History:    24 - NILM, HPV Positive    Review of Systems   Eyes:  Negative for visual disturbance.   Respiratory:  Negative for shortness of breath.    Gastrointestinal:  Negative for abdominal pain, constipation, diarrhea, nausea and vomiting.   Genitourinary:  Negative for dysuria, vaginal bleeding and vaginal discharge.   Neurological:  Negative for headaches.       OBJECTIVE:  Vitals:  /66   Pulse 63   Wt 64.4 kg (142 lb)   LMP 2024 (Exact Date)   BMI 24.37 kg/m²     Urine Pregnancy Test: Positive  Fundal height - Size equals dates  Fetal heart tones - N/A    Physical Exam  Appearance:  Normal appearance  Cardiovascular:  Normal rate, Capillary refill less than 2 seconds  Pulmonary:  Normal effort, no distress  Abdominal:  No tenderness  MS:  No Swelling, No dependent edema  Skin:  Warm, dry  Neuro:  Alert and oriented x3, reflexes normal.  Psychiatric:  Normal mood and behavior    ASSESSMENT & PLAN:  23 y.o. female  IUP at 5w1d    There are no problems to display for this patient.     Diagnosis Orders   1. Prenatal care in first trimester  US OB LESS THAN 14 WEEKS SINGLE OR FIRST GESTATION    US OB TRANSVAGINAL    HCG, Quantitative, Pregnancy

## 2024-05-04 DIAGNOSIS — Z72.51 UNPROTECTED SEXUAL INTERCOURSE: ICD-10-CM

## 2024-05-04 LAB
ABO + RH BLD: NORMAL
BACTERIA UR CULT: NORMAL
BLD GP AB SCN SERPL QL: NORMAL
HEPATITIS C ANTIBODY: NONREACTIVE
HIV AG/AB: NONREACTIVE
RPR SER QL: NORMAL

## 2024-05-06 DIAGNOSIS — O20.0 THREATENED MISCARRIAGE IN EARLY PREGNANCY: ICD-10-CM

## 2024-05-06 LAB — GONADOTROPIN, CHORIONIC (HCG) QUANT: NORMAL MIU/ML

## 2024-05-07 LAB
C TRACH DNA UR QL NAA+PROBE: NEGATIVE
N GONORRHOEA DNA UR QL NAA+PROBE: NEGATIVE

## 2024-05-09 ENCOUNTER — HOSPITAL ENCOUNTER (OUTPATIENT)
Dept: ULTRASOUND IMAGING | Age: 24
Discharge: HOME | End: 2024-05-11
Payer: COMMERCIAL

## 2024-05-09 DIAGNOSIS — Z34.91 PRENATAL CARE IN FIRST TRIMESTER: ICD-10-CM

## 2024-05-09 LAB
SPECIMEN SOURCE: NORMAL
T VAGINALIS RRNA SPEC QL NAA+PROBE: NEGATIVE

## 2024-05-09 PROCEDURE — 76817 TRANSVAGINAL US OBSTETRIC: CPT

## 2024-05-09 PROCEDURE — 76801 OB US < 14 WKS SINGLE FETUS: CPT

## 2024-05-11 ENCOUNTER — ROUTINE PRENATAL (OUTPATIENT)
Dept: OBGYN CLINIC | Age: 24
End: 2024-05-11

## 2024-05-11 VITALS
HEART RATE: 82 BPM | BODY MASS INDEX: 24.89 KG/M2 | SYSTOLIC BLOOD PRESSURE: 108 MMHG | WEIGHT: 145 LBS | DIASTOLIC BLOOD PRESSURE: 62 MMHG

## 2024-05-11 DIAGNOSIS — Z34.00 INITIAL OBSTETRIC VISIT, ANTEPARTUM: Primary | ICD-10-CM

## 2024-05-11 DIAGNOSIS — Z3A.01 6 WEEKS GESTATION OF PREGNANCY: ICD-10-CM

## 2024-05-11 PROBLEM — O26.899 RH NEGATIVE STATE IN ANTEPARTUM PERIOD: Status: ACTIVE | Noted: 2024-05-11

## 2024-05-11 PROBLEM — Z67.91 RH NEGATIVE STATE IN ANTEPARTUM PERIOD: Status: ACTIVE | Noted: 2024-05-11

## 2024-05-11 ASSESSMENT — ENCOUNTER SYMPTOMS
CONSTIPATION: 0
SHORTNESS OF BREATH: 0
DIARRHEA: 0
NAUSEA: 1
VOMITING: 1
ABDOMINAL PAIN: 0

## 2024-05-11 NOTE — PROGRESS NOTES
Pt is still feeling very nauseous  
Toxoplasmosis No     Risk of CMV No     List of other infections      Rash or Viral Illness Since LMP? No     Additional comments      Patient or partner has Hepatitis B No      Medications:     Current Outpatient Medications on File Prior to Visit   Medication Sig Dispense Refill    ondansetron (ZOFRAN-ODT) 4 MG disintegrating tablet Let 1 tablet dissolve on your tongue every 4 hours as needed to relieve nausea/vomiting. 30 tablet 2    Prenatal Vit-Fe Fumarate-FA (M-MITA PLUS) 27-1 MG TABS Take 1 tablet by mouth daily 90 tablet 3    busPIRone (BUSPAR) 5 MG tablet Take 1 tablet by mouth 2 times daily 60 tablet 2     No current facility-administered medications on file prior to visit.     Review of Systems:     Review of Systems   Constitutional:  Positive for appetite change and fatigue.   Eyes:  Negative for visual disturbance.   Respiratory:  Negative for shortness of breath.    Gastrointestinal:  Positive for nausea and vomiting. Negative for abdominal pain, constipation and diarrhea.   Genitourinary:  Positive for frequency. Negative for dysuria, pelvic pain, vaginal bleeding and vaginal discharge.   Neurological:  Negative for dizziness, syncope and headaches.     Physical Exam:     Vitals:  /62   Pulse 82   Wt 65.8 kg (145 lb)   LMP 2024 (Exact Date)   BMI 24.89 kg/m²     Physical Exam  Appearance:  Normal appearance  Cardiovascular:  Normal rate, Capillary refill less than 2 seconds  Pulmonary:  Normal effort, no distress  Abdominal:  No tenderness  MS:  No Swelling, No dependent edema  Skin:  Warm, dry  Neuro:  Alert and oriented x3, reflexes normal.  Psychiatric:  Normal mood and behavior    Assessment:     23 y.o. female  IUP at 6w2d    Patient Active Problem List    Diagnosis Date Noted    Rh negative state in antepartum period 2024     Rhogam:  _____        Diagnosis Orders   1. Initial obstetric visit, antepartum        2. 6 weeks gestation of pregnancy            Plan:

## 2024-06-05 ENCOUNTER — ROUTINE PRENATAL (OUTPATIENT)
Dept: OBGYN CLINIC | Age: 24
End: 2024-06-05

## 2024-06-05 VITALS
DIASTOLIC BLOOD PRESSURE: 62 MMHG | SYSTOLIC BLOOD PRESSURE: 110 MMHG | BODY MASS INDEX: 25.23 KG/M2 | WEIGHT: 147 LBS | HEART RATE: 66 BPM

## 2024-06-05 DIAGNOSIS — Z13.71 SCREENING FOR GENETIC DISEASE CARRIER STATUS: ICD-10-CM

## 2024-06-05 DIAGNOSIS — Z67.91 RH NEGATIVE STATE IN ANTEPARTUM PERIOD: ICD-10-CM

## 2024-06-05 DIAGNOSIS — Z36.0 ENCOUNTER FOR ANTENATAL SCREENING FOR CHROMOSOMAL ANOMALIES: ICD-10-CM

## 2024-06-05 DIAGNOSIS — Z3A.09 9 WEEKS GESTATION OF PREGNANCY: ICD-10-CM

## 2024-06-05 DIAGNOSIS — O26.899 RH NEGATIVE STATE IN ANTEPARTUM PERIOD: ICD-10-CM

## 2024-06-05 DIAGNOSIS — Z34.91 PRENATAL CARE IN FIRST TRIMESTER: Primary | ICD-10-CM

## 2024-06-05 PROCEDURE — 0502F SUBSEQUENT PRENATAL CARE: CPT | Performed by: ADVANCED PRACTICE MIDWIFE

## 2024-06-05 NOTE — PROGRESS NOTES
SUBJECTIVE:  Denies bleeding, spotting, leaking of fluid, abnormal discharge.  No fetal movement yet.  Still having nausea, but starting to get a little better.  Discussed collecting the [place] today with fetal Rh screening.    Review of Systems   Eyes:  Negative for visual disturbance.   Respiratory:  Negative for shortness of breath.    Gastrointestinal:  Negative for abdominal pain, constipation, diarrhea, nausea and vomiting.   Genitourinary:  Negative for dysuria, vaginal bleeding and vaginal discharge.   Neurological:  Negative for headaches.     OBJECTIVE:  Physical Exam  Appearance:  Normal appearance  Cardiovascular:  Normal rate, Capillary refill less than 2 seconds  Pulmonary:  Normal effort, no distress  Abdominal:  No tenderness  MS:  No Swelling, No dependent edema  Skin:  Warm, dry  Neuro:  Alert and oriented x3, reflexes normal.  Psychiatric:  Normal mood and behavior    ASSESSMENT:  24 y.o. female  IUP at 9w6d    Patient Active Problem List    Diagnosis Date Noted    Rh negative state in antepartum period 2024 [place] Fetal Rh:  _____        Diagnosis Orders   1. Prenatal care in first trimester        2. 9 weeks gestation of pregnancy        3. Encounter for  screening for chromosomal anomalies  Panorama Prenatal Test      4. Rh negative state in antepartum period  Panorama Prenatal Test      5. Screening for genetic disease carrier status  Horizon 14 (Pan-Ethnic Standard)    Horizon 14 (Pan-Ethnic Standard)          PLAN:  Panorama (with fetal Rh)/Horizon collected today     Follow-Up  Return in about 3 weeks (around 2024) for Prenatal Care Visit.    DOMONIQUE Jaime CNM

## 2024-06-07 DIAGNOSIS — O21.9 NAUSEA AND VOMITING IN PREGNANCY: ICD-10-CM

## 2024-06-11 RX ORDER — ONDANSETRON 4 MG/1
TABLET, ORALLY DISINTEGRATING ORAL
Qty: 30 TABLET | Refills: 2 | Status: SHIPPED | OUTPATIENT
Start: 2024-06-11

## 2024-06-13 LAB
Lab: NEGATIVE
Lab: NORMAL
NTRA ALPHA-THALASSEMIA: NEGATIVE
NTRA BETA-HEMOGLOBINOPATHIES: NEGATIVE
NTRA CANAVAN DISEASE: NEGATIVE
NTRA CYSTIC FIBROSIS: NEGATIVE
NTRA DUCHENNE/BECKER MUSCULAR DYSTROPHY: NEGATIVE
NTRA FAMILIAL DYSAUTONOMIA: NEGATIVE
NTRA FRAGILE X SYNDROME: NEGATIVE
NTRA GALACTOSEMIA: NEGATIVE
NTRA GAUCHER DISEASE: NEGATIVE
NTRA MEDIUM CHAIN ACYL-COA DEHYDROGENASE DEFICIENCY: NEGATIVE
NTRA POLYCYSTIC KIDNEY DISEASE, AUTOSOMAL RECESSIVE: NEGATIVE
NTRA SMITH-LEMLI-OPITZ SYNDROME: NEGATIVE
NTRA SPINAL MUSCULAR ATROPHY: NEGATIVE
NTRA TAY-SACHS DISEASE: NEGATIVE

## 2024-06-28 ENCOUNTER — ROUTINE PRENATAL (OUTPATIENT)
Dept: OBGYN CLINIC | Age: 24
End: 2024-06-28

## 2024-06-28 VITALS
BODY MASS INDEX: 24.72 KG/M2 | WEIGHT: 144 LBS | HEART RATE: 88 BPM | SYSTOLIC BLOOD PRESSURE: 102 MMHG | DIASTOLIC BLOOD PRESSURE: 62 MMHG

## 2024-06-28 DIAGNOSIS — Z3A.13 13 WEEKS GESTATION OF PREGNANCY: ICD-10-CM

## 2024-06-28 DIAGNOSIS — Z36.89 SCREENING, ANTENATAL, FOR FETAL ANATOMIC SURVEY: ICD-10-CM

## 2024-06-28 DIAGNOSIS — Z36.86 ENCOUNTER FOR ANTENATAL SCREENING FOR CERVICAL LENGTH: ICD-10-CM

## 2024-06-28 DIAGNOSIS — Z34.92 PRENATAL CARE, SECOND TRIMESTER: Primary | ICD-10-CM

## 2024-06-28 PROCEDURE — 0502F SUBSEQUENT PRENATAL CARE: CPT | Performed by: ADVANCED PRACTICE MIDWIFE

## 2024-06-29 ENCOUNTER — PATIENT MESSAGE (OUTPATIENT)
Dept: OBGYN CLINIC | Age: 24
End: 2024-06-29

## 2024-06-29 DIAGNOSIS — O21.9 NAUSEA AND VOMITING IN PREGNANCY: Primary | ICD-10-CM

## 2024-06-29 RX ORDER — PROMETHAZINE HYDROCHLORIDE 25 MG/1
TABLET ORAL
Qty: 60 TABLET | Refills: 1 | Status: SHIPPED | OUTPATIENT
Start: 2024-06-29

## 2024-06-29 NOTE — PROGRESS NOTES
SUBJECTIVE:  Denies bleeding, spotting, leaking of fluid, abnormal discharge.  No fetal movement yet.  Doing well, starting to feel better and have more good days.  Discussed scheduling her next US about 20 weeks for her anatomy scan.    Review of Systems   Eyes:  Negative for visual disturbance.   Respiratory:  Negative for shortness of breath.    Gastrointestinal:  Negative for abdominal pain, constipation, diarrhea, nausea and vomiting.   Genitourinary:  Negative for dysuria, vaginal bleeding and vaginal discharge.   Neurological:  Negative for headaches.     OBJECTIVE:  Physical Exam  Appearance:  Normal appearance  Cardiovascular:  Normal rate, Capillary refill less than 2 seconds  Pulmonary:  Normal effort, no distress  Abdominal:  No tenderness  MS:  No Swelling, No dependent edema  Skin:  Warm, dry  Neuro:  Alert and oriented x3, reflexes normal.  Psychiatric:  Normal mood and behavior    ASSESSMENT:  24 y.o. female  IUP at 13w2d    Patient Active Problem List    Diagnosis Date Noted    Rh negative state in antepartum period 2024 [place] Fetal Rh:  Positive  Rhogam:  _____        Diagnosis Orders   1. Prenatal care, second trimester        2. 13 weeks gestation of pregnancy        3. Screening, , for fetal anatomic survey  US OB 14 PLUS WEEKS SINGLE OR FIRST GESTATION    US OB TRANSVAGINAL      4. Encounter for  screening for cervical length  US OB 14 PLUS WEEKS SINGLE OR FIRST GESTATION    US OB TRANSVAGINAL          PLAN:  Schedule 20wk US on/after 8/15/24     Follow-Up  Return in about 4 weeks (around 2024) for Prenatal Care Visit.    DOMONIQUE Jaime CNM

## 2024-07-26 ENCOUNTER — ROUTINE PRENATAL (OUTPATIENT)
Dept: OBGYN CLINIC | Age: 24
End: 2024-07-26

## 2024-07-26 VITALS
SYSTOLIC BLOOD PRESSURE: 106 MMHG | HEART RATE: 64 BPM | DIASTOLIC BLOOD PRESSURE: 60 MMHG | WEIGHT: 144 LBS | BODY MASS INDEX: 24.72 KG/M2

## 2024-07-26 DIAGNOSIS — Z3A.17 17 WEEKS GESTATION OF PREGNANCY: ICD-10-CM

## 2024-07-26 DIAGNOSIS — Z34.92 PRENATAL CARE, SECOND TRIMESTER: Primary | ICD-10-CM

## 2024-07-26 DIAGNOSIS — O21.9 NAUSEA AND VOMITING IN PREGNANCY: ICD-10-CM

## 2024-07-26 PROBLEM — F31.61 MIXED BIPOLAR AFFECTIVE DISORDER, MILD (HCC): Status: ACTIVE | Noted: 2024-07-26

## 2024-07-26 PROBLEM — K08.89 PAIN, DENTAL: Status: ACTIVE | Noted: 2024-07-26

## 2024-07-26 PROCEDURE — 0502F SUBSEQUENT PRENATAL CARE: CPT | Performed by: ADVANCED PRACTICE MIDWIFE

## 2024-07-26 RX ORDER — ONDANSETRON 4 MG/1
TABLET, ORALLY DISINTEGRATING ORAL
Qty: 30 TABLET | Refills: 2 | Status: SHIPPED | OUTPATIENT
Start: 2024-07-26

## 2024-07-26 NOTE — PROGRESS NOTES
SUBJECTIVE:  Denies bleeding, spotting, leaking of fluid, abnormal discharge.  No fetal movement yet.  Doing well, feeling good, no concerns today.  Anatomy US already scheduled on 8/15/24.    Review of Systems   Eyes:  Negative for visual disturbance.   Respiratory:  Negative for shortness of breath.    Gastrointestinal:  Negative for abdominal pain, constipation, diarrhea, nausea and vomiting.   Genitourinary:  Negative for dysuria, vaginal bleeding and vaginal discharge.   Neurological:  Negative for headaches.     OBJECTIVE:  Physical Exam  Appearance:  Normal appearance  Cardiovascular:  Normal rate, Capillary refill less than 2 seconds  Pulmonary:  Normal effort, no distress  Abdominal:  No tenderness  MS:  No Swelling, No dependent edema  Skin:  Warm, dry  Neuro:  Alert and oriented x3, reflexes normal.  Psychiatric:  Normal mood and behavior    ASSESSMENT:  24 y.o. female  IUP at 17w1d    Patient Active Problem List    Diagnosis Date Noted    Pain, dental 2024    Mixed bipolar affective disorder, mild (HCC) 2024    Rh negative state in antepartum period 2024 [place] Fetal Rh:  Positive  Rhogam:  _____        Diagnosis Orders   1. Prenatal care, second trimester        2. 17 weeks gestation of pregnancy            PLAN:  Keep US appointment on 8/15/24     Follow-Up  Return in about 4 weeks (around 2024) for Prenatal Care Visit.    DOMONIQUE Jaime CNM

## 2024-08-15 ENCOUNTER — HOSPITAL ENCOUNTER (OUTPATIENT)
Dept: ULTRASOUND IMAGING | Age: 24
Discharge: HOME | End: 2024-08-17
Attending: ADVANCED PRACTICE MIDWIFE
Payer: COMMERCIAL

## 2024-08-15 DIAGNOSIS — Z36.89 SCREENING, ANTENATAL, FOR FETAL ANATOMIC SURVEY: ICD-10-CM

## 2024-08-15 DIAGNOSIS — Z36.86 ENCOUNTER FOR ANTENATAL SCREENING FOR CERVICAL LENGTH: ICD-10-CM

## 2024-08-15 PROCEDURE — 76817 TRANSVAGINAL US OBSTETRIC: CPT

## 2024-08-15 PROCEDURE — 76805 OB US >/= 14 WKS SNGL FETUS: CPT

## 2024-08-16 NOTE — RESULT ENCOUNTER NOTE
8/15/24 US:  20w 1d, EDC 1/1/25.  Cephalic presentation, anatomy incomplete.   grams (0lb. 11oz) at 43%.  SEAN WNL.  Anterior placenta.  Cervical length 3.8 cm.    Impression:    Fetal Anatomy incomplete - cavum septum pellucidum, upper lip/face, RVOT/LVOT  Reassuring cervical length  Fetal Growth 43%

## 2024-08-23 ENCOUNTER — ROUTINE PRENATAL (OUTPATIENT)
Dept: OBGYN CLINIC | Age: 24
End: 2024-08-23

## 2024-08-23 VITALS
SYSTOLIC BLOOD PRESSURE: 100 MMHG | WEIGHT: 144 LBS | BODY MASS INDEX: 24.72 KG/M2 | HEART RATE: 68 BPM | DIASTOLIC BLOOD PRESSURE: 64 MMHG

## 2024-08-23 DIAGNOSIS — Z3A.21 21 WEEKS GESTATION OF PREGNANCY: ICD-10-CM

## 2024-08-23 DIAGNOSIS — Z34.92 PRENATAL CARE, SECOND TRIMESTER: Primary | ICD-10-CM

## 2024-08-23 PROCEDURE — 0502F SUBSEQUENT PRENATAL CARE: CPT | Performed by: ADVANCED PRACTICE MIDWIFE

## 2024-08-23 NOTE — PROGRESS NOTES
SUBJECTIVE:  Denies bleeding, spotting, leaking of fluid, abnormal discharge.  Just beginning to feel fetal movement.  Beginning to have some achilles tendon/heel pain.  Reviewed US results.  Next US at 30 weeks to complete anatomy and interval growth.    Review of Systems   Eyes:  Negative for visual disturbance.   Respiratory:  Negative for shortness of breath.    Gastrointestinal:  Negative for abdominal pain, constipation, diarrhea, nausea and vomiting.   Genitourinary:  Negative for dysuria, vaginal bleeding and vaginal discharge.   Neurological:  Negative for headaches.     OBJECTIVE:  8/15/24 US: 20w 1d, EDC 25. Cephalic presentation, anatomy incomplete.  grams (0lb. 11oz) at 43%. SEAN WNL. Anterior placenta. Cervical length 3.8 cm.     Physical Exam  Appearance:  Normal appearance  Cardiovascular:  Normal rate, Capillary refill less than 2 seconds  Pulmonary:  Normal effort, no distress  Abdominal:  No tenderness  MS:  No Swelling, No dependent edema  Skin:  Warm, dry  Neuro:  Alert and oriented x3, reflexes normal.  Psychiatric:  Normal mood and behavior    ASSESSMENT:  24 y.o. female  IUP at 21w1d  Fetal Anatomy incomplete - cavum septum pellucidum, upper lip/face, RVOT/LVOT  Reassuring cervical length  Fetal Growth 43%    Patient Active Problem List    Diagnosis Date Noted    Pain, dental 2024    Mixed bipolar affective disorder, mild (HCC) 2024    Rh negative state in antepartum period 2024 [place] Fetal Rh:  Positive  Rhogam:  _____        Diagnosis Orders   1. Prenatal care, second trimester        2. 21 weeks gestation of pregnancy            PLAN:  Next US at 30 weeks - interval growth, complete anatomy     Follow-Up  Return in about 4 weeks (around 2024) for Prenatal Care Visit.    DOMONIQUE Jaime - CRYSTAL

## 2024-09-20 ENCOUNTER — ROUTINE PRENATAL (OUTPATIENT)
Dept: OBGYN CLINIC | Age: 24
End: 2024-09-20

## 2024-09-20 VITALS
DIASTOLIC BLOOD PRESSURE: 66 MMHG | WEIGHT: 153 LBS | HEART RATE: 100 BPM | SYSTOLIC BLOOD PRESSURE: 104 MMHG | BODY MASS INDEX: 26.26 KG/M2

## 2024-09-20 DIAGNOSIS — Z34.92 PRENATAL CARE, SECOND TRIMESTER: Primary | ICD-10-CM

## 2024-09-20 DIAGNOSIS — Z3A.25 25 WEEKS GESTATION OF PREGNANCY: ICD-10-CM

## 2024-09-20 PROCEDURE — 0502F SUBSEQUENT PRENATAL CARE: CPT | Performed by: ADVANCED PRACTICE MIDWIFE

## 2024-10-18 ENCOUNTER — ROUTINE PRENATAL (OUTPATIENT)
Dept: OBGYN CLINIC | Age: 24
End: 2024-10-18

## 2024-10-18 VITALS
WEIGHT: 155 LBS | BODY MASS INDEX: 26.61 KG/M2 | SYSTOLIC BLOOD PRESSURE: 108 MMHG | DIASTOLIC BLOOD PRESSURE: 66 MMHG | HEART RATE: 102 BPM

## 2024-10-18 DIAGNOSIS — Z36.2 ENCOUNTER FOR FOLLOW-UP ULTRASOUND OF FETAL ANATOMY: ICD-10-CM

## 2024-10-18 DIAGNOSIS — Z36.89 ENCOUNTER FOR ULTRASOUND TO ASSESS INTERVAL GROWTH OF FETUS: ICD-10-CM

## 2024-10-18 DIAGNOSIS — Z67.91 RH NEGATIVE STATE IN ANTEPARTUM PERIOD: ICD-10-CM

## 2024-10-18 DIAGNOSIS — O26.899 RH NEGATIVE STATE IN ANTEPARTUM PERIOD: ICD-10-CM

## 2024-10-18 DIAGNOSIS — Z23 NEED FOR TDAP VACCINATION: ICD-10-CM

## 2024-10-18 DIAGNOSIS — Z3A.29 29 WEEKS GESTATION OF PREGNANCY: ICD-10-CM

## 2024-10-18 DIAGNOSIS — Z34.93 PRENATAL CARE, THIRD TRIMESTER: Primary | ICD-10-CM

## 2024-10-18 DIAGNOSIS — Z34.92 PRENATAL CARE, SECOND TRIMESTER: ICD-10-CM

## 2024-10-18 LAB
BASOPHILS # BLD: 0 K/UL (ref 0–0.2)
BASOPHILS NFR BLD: 0.2 %
EOSINOPHIL # BLD: 0.1 K/UL (ref 0–0.7)
EOSINOPHIL NFR BLD: 0.6 %
ERYTHROCYTE [DISTWIDTH] IN BLOOD BY AUTOMATED COUNT: 12.4 % (ref 11.5–14.5)
GLUCOSE, 1HR PP: 107 MG/DL (ref 60–140)
HCT VFR BLD AUTO: 32.7 % (ref 37–47)
HGB BLD-MCNC: 11.4 G/DL (ref 12–16)
LYMPHOCYTES # BLD: 1.6 K/UL (ref 1–4.8)
LYMPHOCYTES NFR BLD: 13 %
MCH RBC QN AUTO: 30.2 PG (ref 27–31.3)
MCHC RBC AUTO-ENTMCNC: 34.9 % (ref 33–37)
MCV RBC AUTO: 86.5 FL (ref 79.4–94.8)
MONOCYTES # BLD: 0.9 K/UL (ref 0.2–0.8)
MONOCYTES NFR BLD: 7.1 %
NEUTROPHILS # BLD: 9.7 K/UL (ref 1.4–6.5)
NEUTS SEG NFR BLD: 78.4 %
PLATELET # BLD AUTO: 248 K/UL (ref 130–400)
RBC # BLD AUTO: 3.78 M/UL (ref 4.2–5.4)
RPR SER QL: NORMAL
WBC # BLD AUTO: 12.4 K/UL (ref 4.8–10.8)

## 2024-10-18 NOTE — PROGRESS NOTES
SUBJECTIVE:  Denies bleeding, spotting, leaking of fluid, abnormal discharge.  Good fetal movement  Collecting 28 week labs today.  She would like the Tdap vaccine.    Review of Systems   Eyes:  Negative for visual disturbance.   Respiratory:  Negative for shortness of breath.    Gastrointestinal:  Negative for abdominal pain, constipation, diarrhea, nausea and vomiting.   Genitourinary:  Negative for dysuria, vaginal bleeding and vaginal discharge.   Neurological:  Negative for headaches.     OBJECTIVE:  Physical Exam  Appearance:  Normal appearance  Cardiovascular:  Normal rate, Capillary refill less than 2 seconds  Pulmonary:  Normal effort, no distress  Abdominal:  No tenderness  MS:  No Swelling, No dependent edema  Skin:  Warm, dry  Neuro:  Alert and oriented x3, reflexes normal.  Psychiatric:  Normal mood and behavior    ASSESSMENT:  24 y.o. female  IUP at 29w1d    Patient Active Problem List    Diagnosis Date Noted    Pain, dental 2024    Mixed bipolar affective disorder, mild (HCC) 2024    Rh negative state in antepartum period 2024 [place] Fetal Rh:  Positive  Rhogam:  10/18/24        Diagnosis Orders   1. Prenatal care, third trimester        2. 29 weeks gestation of pregnancy        3. Need for Tdap vaccination  Tdap, BOOSTRIX, (age 10 yrs+), IM      4. Rh negative state in antepartum period  Rho(D) immune globulin (HYPERRHO;RHOGAM) injection 300 mcg      5. Encounter for ultrasound to assess interval growth of fetus  US OB 1 OR MORE FETUS LIMITED    US OB TRANSVAGINAL      6. Encounter for follow-up ultrasound of fetal anatomy  US OB 1 OR MORE FETUS LIMITED    US OB TRANSVAGINAL          PLAN:  Collecting 28 week labs today  Tdap and Rhogam given  Schedule growth US at 30 weeks - on/after 10/24/24     Follow-Up  Return in about 2 weeks (around 2024) for Prenatal Care Visit.    DOMONIQUE Jaime - CRYSTAL

## 2024-10-24 ENCOUNTER — HOSPITAL ENCOUNTER (OUTPATIENT)
Dept: ULTRASOUND IMAGING | Age: 24
Discharge: HOME | End: 2024-10-26
Attending: ADVANCED PRACTICE MIDWIFE
Payer: COMMERCIAL

## 2024-10-24 DIAGNOSIS — Z36.89 ENCOUNTER FOR ULTRASOUND TO ASSESS INTERVAL GROWTH OF FETUS: ICD-10-CM

## 2024-10-24 DIAGNOSIS — Z36.2 ENCOUNTER FOR FOLLOW-UP ULTRASOUND OF FETAL ANATOMY: ICD-10-CM

## 2024-10-24 PROCEDURE — 76815 OB US LIMITED FETUS(S): CPT

## 2024-10-24 PROCEDURE — 76817 TRANSVAGINAL US OBSTETRIC: CPT

## 2024-10-24 NOTE — RESULT ENCOUNTER NOTE
10/24/24 US:  29w 5d, EDC 1/4/25.  Cephalic presentation, anatomy Complete and WNL.  EFW 1442 grams (3lb. 3oz) at 28%.  SEAN 15 cm.  Anterior placenta, not low-lying.  Cervical length 3.7 cm.    Impression:    Fetal Anatomy complete and WNL  Reassuring cervical length  Appropriate interval growth - 28%

## 2024-10-31 ENCOUNTER — ROUTINE PRENATAL (OUTPATIENT)
Dept: OBGYN CLINIC | Age: 24
End: 2024-10-31

## 2024-10-31 VITALS
BODY MASS INDEX: 27.29 KG/M2 | DIASTOLIC BLOOD PRESSURE: 66 MMHG | SYSTOLIC BLOOD PRESSURE: 100 MMHG | WEIGHT: 159 LBS | HEART RATE: 75 BPM

## 2024-10-31 DIAGNOSIS — Z34.93 PRENATAL CARE, THIRD TRIMESTER: Primary | ICD-10-CM

## 2024-10-31 DIAGNOSIS — Z3A.31 31 WEEKS GESTATION OF PREGNANCY: ICD-10-CM

## 2024-10-31 PROBLEM — O26.893 RH NEGATIVE STATE IN ANTEPARTUM PERIOD, THIRD TRIMESTER: Status: ACTIVE | Noted: 2024-05-11

## 2024-10-31 PROBLEM — K08.89 PAIN, DENTAL: Status: RESOLVED | Noted: 2024-07-26 | Resolved: 2024-10-31

## 2024-10-31 PROCEDURE — 0502F SUBSEQUENT PRENATAL CARE: CPT | Performed by: ADVANCED PRACTICE MIDWIFE

## 2024-10-31 NOTE — PROGRESS NOTES
PLAN:  Growth US at 36 weeks     Follow-Up  Return in about 2 weeks (around 11/14/2024) for Prenatal Care Visit.    DOMONIQUE Jaime CNM

## 2024-11-14 ENCOUNTER — ROUTINE PRENATAL (OUTPATIENT)
Dept: OBGYN CLINIC | Age: 24
End: 2024-11-14

## 2024-11-14 VITALS
SYSTOLIC BLOOD PRESSURE: 102 MMHG | HEART RATE: 86 BPM | WEIGHT: 161 LBS | DIASTOLIC BLOOD PRESSURE: 64 MMHG | BODY MASS INDEX: 27.64 KG/M2

## 2024-11-14 DIAGNOSIS — Z3A.33 33 WEEKS GESTATION OF PREGNANCY: ICD-10-CM

## 2024-11-14 DIAGNOSIS — Z36.89 ENCOUNTER FOR ULTRASOUND TO ASSESS INTERVAL GROWTH OF FETUS: ICD-10-CM

## 2024-11-14 DIAGNOSIS — Z34.93 PRENATAL CARE, THIRD TRIMESTER: Primary | ICD-10-CM

## 2024-11-14 DIAGNOSIS — O26.843 FUNDAL HEIGHT LOW FOR DATES IN THIRD TRIMESTER: ICD-10-CM

## 2024-11-14 PROCEDURE — 0502F SUBSEQUENT PRENATAL CARE: CPT | Performed by: ADVANCED PRACTICE MIDWIFE

## 2024-11-14 NOTE — PROGRESS NOTES
SUBJECTIVE:  Denies bleeding, spotting, leaking of fluid, abnormal discharge.  Good fetal movement  Fundal height measuring much smaller for dates than expected.  Last growth US was 3 weeks ago, growth was 28%.    Review of Systems   Eyes:  Negative for visual disturbance.   Respiratory:  Negative for shortness of breath.    Gastrointestinal:  Negative for abdominal pain, constipation, diarrhea, nausea and vomiting.   Genitourinary:  Negative for dysuria, vaginal bleeding and vaginal discharge.   Neurological:  Negative for headaches.     OBJECTIVE:  10/24/24 US: 29w 5d, EDC 25. Cephalic presentation, anatomy Complete and WNL. EFW 1442 grams (3lb. 3oz) at 28%. SEAN 15 cm. Anterior placenta, not low-lying. Cervical length 3.7 cm.     Physical Exam  Appearance:  Normal appearance  Cardiovascular:  Normal rate, Capillary refill less than 2 seconds  Pulmonary:  Normal effort, no distress  Abdominal:  No tenderness  MS:  No Swelling, No dependent edema  Skin:  Warm, dry  Neuro:  Alert and oriented x3, reflexes normal.  Psychiatric:  Normal mood and behavior    ASSESSMENT:  24 y.o. female  IUP at 33w0d  Fetal growth 28%     Patient Active Problem List    Diagnosis Date Noted    Fundal height low for dates in third trimester 2024     10/24/24 US: 29w 5d, EDC 25. EFW 1442 grams (3lb. 3oz) at 28%. SEAN 15 cm.   24:  Fundal Height 30cm at 33w 0d  11/15/24 US:  _____      Mixed bipolar affective disorder, mild (HCC) 2024    Rh negative state in antepartum period, third trimester 2024 [place] Fetal Rh:  Positive  Rhogam:  10/18/24        Diagnosis Orders   1. Prenatal care, third trimester        2. 33 weeks gestation of pregnancy        3. Fundal height low for dates in third trimester  US OB 1 OR MORE FETUS LIMITED      4. Encounter for ultrasound to assess interval growth of fetus  US OB 1 OR MORE FETUS LIMITED    US OB TRANSVAGINAL          PLAN:  Repeat growth US

## 2024-11-15 ENCOUNTER — HOSPITAL ENCOUNTER (OUTPATIENT)
Dept: ULTRASOUND IMAGING | Age: 24
Discharge: HOME | End: 2024-11-17
Attending: ADVANCED PRACTICE MIDWIFE
Payer: COMMERCIAL

## 2024-11-15 DIAGNOSIS — O26.843 FUNDAL HEIGHT LOW FOR DATES IN THIRD TRIMESTER: ICD-10-CM

## 2024-11-15 DIAGNOSIS — Z36.89 ENCOUNTER FOR ULTRASOUND TO ASSESS INTERVAL GROWTH OF FETUS: ICD-10-CM

## 2024-11-15 LAB
ABDOMINAL CIRCUMFERENCE: 27.28 CM
ABDOMINAL CIRCUMFERENCE: 27.31 CM
ABDOMINAL CIRCUMFERENCE: 27.34 CM
ABDOMINAL CIRCUMFERENCE: 27.42 CM
BIPARIETAL DIAMETER: 8.13 CM
BIPARIETAL DIAMETER: 8.17 CM
BIPARIETAL DIAMETER: 8.19 CM
BIPARIETAL DIAMETER: 8.19 CM
HEAD CIRCUMFERENCE: 29.42 CM
HEAD CIRCUMFERENCE: 29.56 CM
HEAD CIRCUMFERENCE: 29.63 CM
HEAD CIRCUMFERENCE: 29.63 CM

## 2024-11-15 PROCEDURE — 76815 OB US LIMITED FETUS(S): CPT

## 2024-11-15 PROCEDURE — 76817 TRANSVAGINAL US OBSTETRIC: CPT

## 2024-11-19 NOTE — RESULT ENCOUNTER NOTE
11/15/24 US:  32w 3d, EDC 1/7/25.  Cephalic presentation, anatomy WNL.  EFW 1877 grams (4lb. 2oz) at 13%.  SEAN 12 cm.  Fundal, Grade placenta.  Cervical length 3.6 cm.    Impression:    Fetal Anatomy WNL  Reassuring cervical length  Growth - 13%

## 2024-11-21 ENCOUNTER — ROUTINE PRENATAL (OUTPATIENT)
Dept: OBGYN CLINIC | Age: 24
End: 2024-11-21

## 2024-11-21 VITALS
HEART RATE: 87 BPM | BODY MASS INDEX: 28.15 KG/M2 | SYSTOLIC BLOOD PRESSURE: 118 MMHG | DIASTOLIC BLOOD PRESSURE: 64 MMHG | WEIGHT: 164 LBS

## 2024-11-21 DIAGNOSIS — Z3A.34 34 WEEKS GESTATION OF PREGNANCY: ICD-10-CM

## 2024-11-21 DIAGNOSIS — Z36.89 NST (NON-STRESS TEST) REACTIVE: ICD-10-CM

## 2024-11-21 DIAGNOSIS — Z36.89 ENCOUNTER FOR ULTRASOUND TO ASSESS INTERVAL GROWTH OF FETUS: ICD-10-CM

## 2024-11-21 DIAGNOSIS — Z34.93 PRENATAL CARE, THIRD TRIMESTER: Primary | ICD-10-CM

## 2024-11-21 DIAGNOSIS — O26.843 FUNDAL HEIGHT LOW FOR DATES IN THIRD TRIMESTER: ICD-10-CM

## 2024-11-21 PROCEDURE — 0502F SUBSEQUENT PRENATAL CARE: CPT | Performed by: ADVANCED PRACTICE MIDWIFE

## 2024-11-21 NOTE — PROGRESS NOTES
SUBJECTIVE:  Denies bleeding, spotting, leaking of fluid, abnormal discharge.  Good fetal movement  Discussed that while her US results were reassuring, we will begin weekly NST's because baby is measuring small.  Repeat growth US in 3 weeks.    Review of Systems   Eyes:  Negative for visual disturbance.   Respiratory:  Negative for shortness of breath.    Gastrointestinal:  Negative for abdominal pain, constipation, diarrhea, nausea and vomiting.   Genitourinary:  Negative for dysuria, vaginal bleeding and vaginal discharge.   Neurological:  Negative for headaches.     OBJECTIVE:  11/15/24 US:  32w 3d, EDC 25.  Cephalic presentation, anatomy WNL.  EFW 1877 grams (4lb. 2oz) at 13%.  SEAN 12 cm.  Fundal, Grade placenta.  Cervical length 3.6 cm.     NST:    Indication - Small for gestational age  Run Time - 42 minutes  FHR - 140, moderate variability, 15x15 accelerations, No decelerations.    Uterus - No contractions, resting tone soft  Interventions - Sugar Containing Drink and Position Change   - Fetal Response - FHR Acceleration  Outcome - Reactive NST    Physical Exam  Appearance:  Normal appearance  Cardiovascular:  Normal rate, Capillary refill less than 2 seconds  Pulmonary:  Normal effort, no distress  Abdominal:  No tenderness  MS:  No Swelling, No dependent edema  Skin:  Warm, dry  Neuro:  Alert and oriented x3, reflexes normal.  Psychiatric:  Normal mood and behavior    ASSESSMENT:  24 y.o. female  IUP at 34w0d  Fetal Anatomy WNL  Reassuring cervical length  Growth - 13%    Patient Active Problem List    Diagnosis Date Noted    Fundal height low for dates in third trimester 2024     10/24/24 US: 29w 5d, EDC 25. EFW 1442 grams (3lb. 3oz) at 28%. SEAN 15 cm.   24:  Fundal Height 30cm at 33w 0d  11/15/24 US:  32w 3d, EDC 25.  EFW 1877 grams (4lb. 2oz) at 13%.  SEAN 12 cm.         Mixed bipolar affective disorder, mild (HCC) 2024    Rh negative state in antepartum period, third

## 2024-12-05 ENCOUNTER — ROUTINE PRENATAL (OUTPATIENT)
Dept: OBGYN CLINIC | Age: 24
End: 2024-12-05
Payer: COMMERCIAL

## 2024-12-05 VITALS
HEART RATE: 84 BPM | BODY MASS INDEX: 28.84 KG/M2 | DIASTOLIC BLOOD PRESSURE: 72 MMHG | SYSTOLIC BLOOD PRESSURE: 118 MMHG | WEIGHT: 168 LBS

## 2024-12-05 DIAGNOSIS — O26.843 FUNDAL HEIGHT LOW FOR DATES IN THIRD TRIMESTER: ICD-10-CM

## 2024-12-05 DIAGNOSIS — Z36.89 NST (NON-STRESS TEST) REACTIVE: ICD-10-CM

## 2024-12-05 DIAGNOSIS — Z34.93 PRENATAL CARE, THIRD TRIMESTER: ICD-10-CM

## 2024-12-05 DIAGNOSIS — Z3A.36 36 WEEKS GESTATION OF PREGNANCY: ICD-10-CM

## 2024-12-05 DIAGNOSIS — Z34.93 PRENATAL CARE, THIRD TRIMESTER: Primary | ICD-10-CM

## 2024-12-05 PROCEDURE — 59025 FETAL NON-STRESS TEST: CPT | Performed by: ADVANCED PRACTICE MIDWIFE

## 2024-12-05 PROCEDURE — 0502F SUBSEQUENT PRENATAL CARE: CPT | Performed by: ADVANCED PRACTICE MIDWIFE

## 2024-12-05 NOTE — PROGRESS NOTES
SUBJECTIVE:  Denies bleeding, spotting, leaking of fluid, abnormal discharge.  Good fetal movement    Review of Systems   Eyes:  Negative for visual disturbance.   Respiratory:  Negative for shortness of breath.    Gastrointestinal:  Negative for abdominal pain, constipation, diarrhea, nausea and vomiting.   Genitourinary:  Negative for dysuria, vaginal bleeding and vaginal discharge.   Neurological:  Negative for headaches.     OBJECTIVE  Cervical Exam:  Declined.  GBS Collected.    NST:    Indication - Small for Gestational Age  Run Time - 10 minutes  FHR - 145, moderate variability, 15x15 accelerations, No decelerations.    Uterus - No contractions, resting tone soft  Interventions - None   - Fetal Response - N/A  Outcome - Reactive NST    Physical Exam  Appearance:  Normal appearance  Cardiovascular:  Normal rate, Capillary refill less than 2 seconds  Pulmonary:  Normal effort, no distress  Abdominal:  No tenderness  MS:  No Swelling, No dependent edema  Skin:  Warm, dry  Neuro:  Alert and oriented x3, reflexes normal.  Psychiatric:  Normal mood and behavior    ASSESSMENT:  24 y.o. female  IUP at 36w0d    Patient Active Problem List    Diagnosis Date Noted    Fundal height low for dates in third trimester 2024     10/24/24 US: 29w 5d, EDC 25. EFW 1442 grams (3lb. 3oz) at 28%. SEAN 15 cm.   24:  Fundal Height 30cm at 33w 0d  11/15/24 US:  32w 3d, EDC 25.  EFW 1877 grams (4lb. 2oz) at 13%.  SEAN 12 cm.         Mixed bipolar affective disorder, mild (HCC) 2024    Rh negative state in antepartum period, third trimester 2024 [place] Fetal Rh:  Positive  Rhogam:  10/18/24        Diagnosis Orders   1. Prenatal care, third trimester  Culture, Strep B Screen, Vaginal/Rectal      2. 36 weeks gestation of pregnancy        3. Fundal height low for dates in third trimester        4. NST (non-stress test) reactive            PLAN:  GBS Collected     Follow-Up  Return in about 1

## 2024-12-06 ENCOUNTER — HOSPITAL ENCOUNTER (OUTPATIENT)
Dept: ULTRASOUND IMAGING | Age: 24
Discharge: HOME | End: 2024-12-08
Attending: ADVANCED PRACTICE MIDWIFE
Payer: COMMERCIAL

## 2024-12-06 DIAGNOSIS — Z36.89 ENCOUNTER FOR ULTRASOUND TO ASSESS INTERVAL GROWTH OF FETUS: ICD-10-CM

## 2024-12-06 DIAGNOSIS — O26.843 FUNDAL HEIGHT LOW FOR DATES IN THIRD TRIMESTER: ICD-10-CM

## 2024-12-06 PROCEDURE — 76815 OB US LIMITED FETUS(S): CPT

## 2024-12-08 LAB — GP B STREP SPEC QL CULT: NORMAL

## 2024-12-12 ENCOUNTER — ROUTINE PRENATAL (OUTPATIENT)
Dept: OBGYN CLINIC | Age: 24
End: 2024-12-12

## 2024-12-12 VITALS
BODY MASS INDEX: 28.32 KG/M2 | WEIGHT: 165 LBS | DIASTOLIC BLOOD PRESSURE: 80 MMHG | HEART RATE: 82 BPM | SYSTOLIC BLOOD PRESSURE: 104 MMHG

## 2024-12-12 DIAGNOSIS — Z3A.36 36 WEEKS GESTATION OF PREGNANCY: ICD-10-CM

## 2024-12-12 DIAGNOSIS — O26.843 FUNDAL HEIGHT LOW FOR DATES IN THIRD TRIMESTER: ICD-10-CM

## 2024-12-12 DIAGNOSIS — O26.893 RH NEGATIVE STATE IN ANTEPARTUM PERIOD, THIRD TRIMESTER: ICD-10-CM

## 2024-12-12 DIAGNOSIS — Z67.91 RH NEGATIVE STATE IN ANTEPARTUM PERIOD, THIRD TRIMESTER: ICD-10-CM

## 2024-12-12 DIAGNOSIS — Z34.93 PRENATAL CARE, THIRD TRIMESTER: Primary | ICD-10-CM

## 2024-12-12 NOTE — RESULT ENCOUNTER NOTE
12/6/24 US:  35w 6d, EDC 1/4/25.  Cephalic presentation, anatomy WNL.  EFW 2544 grams (5lb. 10oz) at 20%.  SAEN 8.7 cm.  Fundal, Grade 2 placenta.      Impression:    Fetal Anatomy WNL  Cephalic Presentation  Appropriate interval growth - 20%

## 2024-12-12 NOTE — PROGRESS NOTES
Patient's last menstrual period was 2024 (exact date).  Please reference prenatal and OB flow chart for further information  PT here today for routine prenatal care  Pt endorses fetal movement and denies loss of fluid, contractions or vaginal bleeding  Patient is here for prenatal visit at 37 weeks.  She is  1 para 0.  She had a recent ultrasound for a fundal lag.  The estimated fetal weight was in the normal range.  Baby is AGA.  Fluid volume was 8.9 cm.  She is here for an NST today.  She acknowledges normal fetal movement.  ROS:  Pt denies headache, vision changes, right upper quadrant pain, dysuria, or nausea/vomiting,     PE:  /80   Pulse 82   Wt 74.8 kg (165 lb)   LMP 2024 (Exact Date)   BMI 28.32 kg/m²   Gen - Alert and oriented x 3  HEENT- NC/AT, CVS - RRR, Lungs - CTAB  Abd - FH 36    NST is reactive  A biophysical profile is done to evaluate amniotic fluid volume and it is 11.8 cm  Fetal tone 2/2.  Fetal breathing 2/2.  Amniotic fluid volume 2/2 fetal movement 2/2        ASSESSMENT AND PLAN:   IUP at 37 weeks  AGA fetus  Biophysical profile and NST a 10/10.  Patient to continue to monitor kick counts twice daily and to follow-up here in 1 week.

## 2024-12-19 ENCOUNTER — ROUTINE PRENATAL (OUTPATIENT)
Dept: OBGYN CLINIC | Age: 24
End: 2024-12-19
Payer: COMMERCIAL

## 2024-12-19 VITALS
DIASTOLIC BLOOD PRESSURE: 70 MMHG | BODY MASS INDEX: 29.01 KG/M2 | SYSTOLIC BLOOD PRESSURE: 116 MMHG | WEIGHT: 169 LBS | HEART RATE: 79 BPM

## 2024-12-19 DIAGNOSIS — Z34.93 PRENATAL CARE, THIRD TRIMESTER: Primary | ICD-10-CM

## 2024-12-19 DIAGNOSIS — Z3A.38 38 WEEKS GESTATION OF PREGNANCY: ICD-10-CM

## 2024-12-19 DIAGNOSIS — O26.843 FUNDAL HEIGHT LOW FOR DATES IN THIRD TRIMESTER: ICD-10-CM

## 2024-12-19 DIAGNOSIS — Z36.89 NST (NON-STRESS TEST) REACTIVE: ICD-10-CM

## 2024-12-19 PROCEDURE — 0502F SUBSEQUENT PRENATAL CARE: CPT | Performed by: ADVANCED PRACTICE MIDWIFE

## 2024-12-19 PROCEDURE — 59025 FETAL NON-STRESS TEST: CPT | Performed by: ADVANCED PRACTICE MIDWIFE

## 2024-12-19 NOTE — PROGRESS NOTES
SUBJECTIVE:  Denies bleeding, spotting, leaking of fluid, abnormal discharge.  Good fetal movement    Review of Systems   Eyes:  Negative for visual disturbance.   Respiratory:  Negative for shortness of breath.    Gastrointestinal:  Negative for abdominal pain, constipation, diarrhea, nausea and vomiting.   Genitourinary:  Negative for dysuria, vaginal bleeding and vaginal discharge.   Neurological:  Negative for headaches.     OBJECTIVE  Cervical Exam:   1 cm/Long/-3, Anterior, Soft, no show.  Cephalic    Group B Strep Culture   Date Value Ref Range Status   2024   Final    Rule Out Grp.B Strep:  NEGATIVE FOR GROUP B STREPTOCOCCI  Performed at [facility] [address] [place] [id number]  ([phone]       NST:    Indication - Small for Gestational Age  Run Time - 10 minutes  FHR - 140, moderate variability, 15x15 accelerations, No decelerations.    Uterus - No contractions, resting tone soft  Interventions - None   - Fetal Response - N/A  Outcome - Reactive NST    Physical Exam  Appearance:  Normal appearance  Cardiovascular:  Normal rate, Capillary refill less than 2 seconds  Pulmonary:  Normal effort, no distress  Abdominal:  No tenderness  MS:  No Swelling, No dependent edema  Skin:  Warm, dry  Neuro:  Alert and oriented x3, reflexes normal.  Psychiatric:  Normal mood and behavior    ASSESSMENT:  24 y.o. female  IUP at 38w0d    Patient Active Problem List    Diagnosis Date Noted    Fundal height low for dates in third trimester 2024     10/24/24 US: 29w 5d, EDC 25. EFW 1442 grams (3lb. 3oz) at 28%. SEAN 15 cm.   24:  Fundal Height 30cm at 33w 0d  11/15/24 US:  32w 3d, EDC 25.  EFW 1877 grams (4lb. 2oz) at 13%.  SEAN 12 cm.         Mixed bipolar affective disorder, mild (HCC) 2024    Rh negative state in antepartum period, third trimester 2024 Tolu Fetal Rh:  Positive  Rhogam:  10/18/24        Diagnosis Orders   1. Prenatal care, third trimester

## 2024-12-24 ENCOUNTER — ANESTHESIA (OUTPATIENT)
Dept: LABOR AND DELIVERY | Age: 24
End: 2024-12-24
Payer: COMMERCIAL

## 2024-12-24 ENCOUNTER — HOSPITAL ENCOUNTER (OUTPATIENT)
Age: 24
Discharge: HOME | DRG: 818 | End: 2024-12-24
Attending: ADVANCED PRACTICE MIDWIFE | Admitting: OBSTETRICS & GYNECOLOGY
Payer: COMMERCIAL

## 2024-12-24 ENCOUNTER — HOSPITAL ENCOUNTER (INPATIENT)
Age: 24
LOS: 2 days | Discharge: HOME | DRG: 818 | End: 2024-12-26
Attending: ADVANCED PRACTICE MIDWIFE | Admitting: OBSTETRICS & GYNECOLOGY
Payer: COMMERCIAL

## 2024-12-24 ENCOUNTER — ANESTHESIA EVENT (OUTPATIENT)
Dept: LABOR AND DELIVERY | Age: 24
End: 2024-12-24
Payer: COMMERCIAL

## 2024-12-24 VITALS
TEMPERATURE: 97.8 F | OXYGEN SATURATION: 99 % | DIASTOLIC BLOOD PRESSURE: 80 MMHG | SYSTOLIC BLOOD PRESSURE: 128 MMHG | HEART RATE: 78 BPM | RESPIRATION RATE: 22 BRPM

## 2024-12-24 PROBLEM — Z3A.38 PREGNANCY WITH 38 COMPLETED WEEKS GESTATION: Status: ACTIVE | Noted: 2024-12-24

## 2024-12-24 PROBLEM — K29.70 GASTRITIS: Status: ACTIVE | Noted: 2024-12-24

## 2024-12-24 PROBLEM — O21.9 NAUSEA AND VOMITING IN PREGNANCY: Status: ACTIVE | Noted: 2024-12-24

## 2024-12-24 PROBLEM — O99.280 DEHYDRATION DURING PREGNANCY: Status: ACTIVE | Noted: 2024-12-24

## 2024-12-24 PROBLEM — E86.0 DEHYDRATION DURING PREGNANCY: Status: ACTIVE | Noted: 2024-12-24

## 2024-12-24 LAB
ALBUMIN SERPL-MCNC: 3.8 G/DL (ref 3.5–4.6)
ALP SERPL-CCNC: 189 U/L (ref 40–130)
ALT SERPL-CCNC: 6 U/L (ref 0–33)
AMPHET UR QL SCN: NORMAL
ANION GAP SERPL CALCULATED.3IONS-SCNC: 15 MEQ/L (ref 9–15)
APTT PPP: 25.7 SEC (ref 24.4–36.8)
AST SERPL-CCNC: 20 U/L (ref 0–35)
BACTERIA URNS QL MICRO: ABNORMAL /HPF
BARBITURATES UR QL SCN: NORMAL
BASOPHILS # BLD: 0 K/UL (ref 0–0.2)
BASOPHILS NFR BLD: 0.1 %
BENZODIAZ UR QL SCN: NORMAL
BILIRUB SERPL-MCNC: <0.2 MG/DL (ref 0.2–0.7)
BILIRUB UR QL STRIP: NEGATIVE
BUN SERPL-MCNC: 7 MG/DL (ref 6–20)
CALCIUM SERPL-MCNC: 9.1 MG/DL (ref 8.5–9.9)
CANNABINOIDS UR QL SCN: NORMAL
CHLORIDE SERPL-SCNC: 104 MEQ/L (ref 95–107)
CLARITY UR: CLEAR
CO2 SERPL-SCNC: 18 MEQ/L (ref 20–31)
COCAINE UR QL SCN: NORMAL
COLOR UR: YELLOW
CREAT SERPL-MCNC: 0.42 MG/DL (ref 0.5–0.9)
DRUG SCREEN COMMENT UR-IMP: NORMAL
EOSINOPHIL # BLD: 0 K/UL (ref 0–0.7)
EOSINOPHIL NFR BLD: 0.1 %
EPI CELLS #/AREA URNS AUTO: ABNORMAL /HPF (ref 0–5)
ERYTHROCYTE [DISTWIDTH] IN BLOOD BY AUTOMATED COUNT: 12.7 % (ref 11.5–14.5)
FENTANYL SCREEN, URINE: NORMAL
GLOBULIN SER CALC-MCNC: 2.8 G/DL (ref 2.3–3.5)
GLUCOSE SERPL-MCNC: 92 MG/DL (ref 70–99)
GLUCOSE UR STRIP-MCNC: NEGATIVE MG/DL
HBV SURFACE AG SERPL QL IA: NORMAL
HCT VFR BLD AUTO: 34.6 % (ref 37–47)
HGB BLD-MCNC: 12.5 G/DL (ref 12–16)
HGB UR QL STRIP: NEGATIVE
HYALINE CASTS #/AREA URNS AUTO: ABNORMAL /HPF (ref 0–5)
INR PPP: 0.9
KETONES UR STRIP-MCNC: 40 MG/DL
LDH SERPL-CCNC: 197 U/L (ref 135–214)
LEUKOCYTE ESTERASE UR QL STRIP: NEGATIVE
LYMPHOCYTES # BLD: 1.4 K/UL (ref 1–4.8)
LYMPHOCYTES NFR BLD: 8.2 %
MCH RBC QN AUTO: 30.6 PG (ref 27–31.3)
MCHC RBC AUTO-ENTMCNC: 36.1 % (ref 33–37)
MCV RBC AUTO: 84.8 FL (ref 79.4–94.8)
METHADONE UR QL SCN: NORMAL
MONOCYTES # BLD: 0.9 K/UL (ref 0.2–0.8)
MONOCYTES NFR BLD: 5 %
NEUTROPHILS # BLD: 14.8 K/UL (ref 1.4–6.5)
NEUTS SEG NFR BLD: 86 %
NITRITE UR QL STRIP: NEGATIVE
OPIATES UR QL SCN: NORMAL
OXYCODONE UR QL SCN: NORMAL
PCP UR QL SCN: NORMAL
PH UR STRIP: 8.5 [PH] (ref 5–9)
PLATELET # BLD AUTO: 249 K/UL (ref 130–400)
POTASSIUM SERPL-SCNC: 3.8 MEQ/L (ref 3.4–4.9)
PROPOXYPH UR QL SCN: NORMAL
PROT SERPL-MCNC: 6.6 G/DL (ref 6.3–8)
PROT UR STRIP-MCNC: 30 MG/DL
PROTHROMBIN TIME: 12.7 SEC (ref 12.3–14.9)
RBC # BLD AUTO: 4.08 M/UL (ref 4.2–5.4)
RBC #/AREA URNS AUTO: ABNORMAL /HPF (ref 0–5)
REAGIN+T PALLIDUM IGG+IGM SERPL-IMP: NORMAL
SODIUM SERPL-SCNC: 137 MEQ/L (ref 135–144)
SP GR UR STRIP: 1.02 (ref 1–1.03)
URATE SERPL-MCNC: 4 MG/DL (ref 2.4–5.7)
URINE REFLEX TO CULTURE: ABNORMAL
UROBILINOGEN UR STRIP-ACNC: 1 E.U./DL
WBC # BLD AUTO: 17.3 K/UL (ref 4.8–10.8)
WBC #/AREA URNS AUTO: ABNORMAL /HPF (ref 0–5)

## 2024-12-24 PROCEDURE — 84550 ASSAY OF BLOOD/URIC ACID: CPT

## 2024-12-24 PROCEDURE — 3600000002 HC SURGERY LEVEL 2 BASE: Performed by: OBSTETRICS & GYNECOLOGY

## 2024-12-24 PROCEDURE — 80307 DRUG TEST PRSMV CHEM ANLYZR: CPT

## 2024-12-24 PROCEDURE — 85730 THROMBOPLASTIN TIME PARTIAL: CPT

## 2024-12-24 PROCEDURE — 87340 HEPATITIS B SURFACE AG IA: CPT

## 2024-12-24 PROCEDURE — 7100000001 HC PACU RECOVERY - ADDTL 15 MIN: Performed by: OBSTETRICS & GYNECOLOGY

## 2024-12-24 PROCEDURE — 2580000003 HC RX 258: Performed by: OBSTETRICS & GYNECOLOGY

## 2024-12-24 PROCEDURE — 3700000001 HC ADD 15 MINUTES (ANESTHESIA): Performed by: OBSTETRICS & GYNECOLOGY

## 2024-12-24 PROCEDURE — 96374 THER/PROPH/DIAG INJ IV PUSH: CPT

## 2024-12-24 PROCEDURE — 83615 LACTATE (LD) (LDH) ENZYME: CPT

## 2024-12-24 PROCEDURE — 1220000000 HC SEMI PRIVATE OB R&B

## 2024-12-24 PROCEDURE — 99214 OFFICE O/P EST MOD 30 MIN: CPT | Performed by: OBSTETRICS & GYNECOLOGY

## 2024-12-24 PROCEDURE — 96375 TX/PRO/DX INJ NEW DRUG ADDON: CPT

## 2024-12-24 PROCEDURE — 6360000002 HC RX W HCPCS: Performed by: OBSTETRICS & GYNECOLOGY

## 2024-12-24 PROCEDURE — 86850 RBC ANTIBODY SCREEN: CPT

## 2024-12-24 PROCEDURE — 36415 COLL VENOUS BLD VENIPUNCTURE: CPT

## 2024-12-24 PROCEDURE — 81001 URINALYSIS AUTO W/SCOPE: CPT

## 2024-12-24 PROCEDURE — 86901 BLOOD TYPING SEROLOGIC RH(D): CPT

## 2024-12-24 PROCEDURE — 6360000002 HC RX W HCPCS: Performed by: NURSE ANESTHETIST, CERTIFIED REGISTERED

## 2024-12-24 PROCEDURE — 80053 COMPREHEN METABOLIC PANEL: CPT

## 2024-12-24 PROCEDURE — 86780 TREPONEMA PALLIDUM: CPT

## 2024-12-24 PROCEDURE — 59160 D & C AFTER DELIVERY: CPT | Performed by: OBSTETRICS & GYNECOLOGY

## 2024-12-24 PROCEDURE — 6370000000 HC RX 637 (ALT 250 FOR IP): Performed by: OBSTETRICS & GYNECOLOGY

## 2024-12-24 PROCEDURE — 86900 BLOOD TYPING SEROLOGIC ABO: CPT

## 2024-12-24 PROCEDURE — 85461 HEMOGLOBIN FETAL: CPT

## 2024-12-24 PROCEDURE — 3700000000 HC ANESTHESIA ATTENDED CARE: Performed by: OBSTETRICS & GYNECOLOGY

## 2024-12-24 PROCEDURE — 59025 FETAL NON-STRESS TEST: CPT | Performed by: OBSTETRICS & GYNECOLOGY

## 2024-12-24 PROCEDURE — 2709999900 HC NON-CHARGEABLE SUPPLY: Performed by: OBSTETRICS & GYNECOLOGY

## 2024-12-24 PROCEDURE — 96361 HYDRATE IV INFUSION ADD-ON: CPT

## 2024-12-24 PROCEDURE — 99285 EMERGENCY DEPT VISIT HI MDM: CPT

## 2024-12-24 PROCEDURE — 7100000000 HC PACU RECOVERY - FIRST 15 MIN: Performed by: OBSTETRICS & GYNECOLOGY

## 2024-12-24 PROCEDURE — 85610 PROTHROMBIN TIME: CPT

## 2024-12-24 PROCEDURE — 10D17ZZ EXTRACTION OF PRODUCTS OF CONCEPTION, RETAINED, VIA NATURAL OR ARTIFICIAL OPENING: ICD-10-PCS | Performed by: OBSTETRICS & GYNECOLOGY

## 2024-12-24 PROCEDURE — 59025 FETAL NON-STRESS TEST: CPT

## 2024-12-24 PROCEDURE — 3600000012 HC SURGERY LEVEL 2 ADDTL 15MIN: Performed by: OBSTETRICS & GYNECOLOGY

## 2024-12-24 PROCEDURE — 2500000003 HC RX 250 WO HCPCS: Performed by: NURSE ANESTHETIST, CERTIFIED REGISTERED

## 2024-12-24 PROCEDURE — 85025 COMPLETE CBC W/AUTO DIFF WBC: CPT

## 2024-12-24 PROCEDURE — 2500000003 HC RX 250 WO HCPCS: Performed by: OBSTETRICS & GYNECOLOGY

## 2024-12-24 RX ORDER — SODIUM CHLORIDE 9 MG/ML
INJECTION, SOLUTION INTRAVENOUS
Status: DISCONTINUED
Start: 2024-12-24 | End: 2024-12-24 | Stop reason: WASHOUT

## 2024-12-24 RX ORDER — FERROUS SULFATE 325(65) MG
325 TABLET ORAL EVERY OTHER DAY
Status: DISCONTINUED | OUTPATIENT
Start: 2024-12-24 | End: 2024-12-26 | Stop reason: HOSPADM

## 2024-12-24 RX ORDER — SODIUM CHLORIDE 0.9 % (FLUSH) 0.9 %
5-40 SYRINGE (ML) INJECTION EVERY 12 HOURS SCHEDULED
Status: DISCONTINUED | OUTPATIENT
Start: 2024-12-24 | End: 2024-12-26 | Stop reason: HOSPADM

## 2024-12-24 RX ORDER — SODIUM CHLORIDE, SODIUM LACTATE, POTASSIUM CHLORIDE, CALCIUM CHLORIDE 600; 310; 30; 20 MG/100ML; MG/100ML; MG/100ML; MG/100ML
INJECTION, SOLUTION INTRAVENOUS CONTINUOUS
Status: DISCONTINUED | OUTPATIENT
Start: 2024-12-24 | End: 2024-12-26 | Stop reason: HOSPADM

## 2024-12-24 RX ORDER — FAMOTIDINE 20 MG/1
20 TABLET, FILM COATED ORAL 2 TIMES DAILY
Status: DISCONTINUED | OUTPATIENT
Start: 2024-12-24 | End: 2024-12-26 | Stop reason: HOSPADM

## 2024-12-24 RX ORDER — OXYTOCIN 10 [USP'U]/ML
INJECTION, SOLUTION INTRAMUSCULAR; INTRAVENOUS
Status: DISPENSED
Start: 2024-12-24 | End: 2024-12-25

## 2024-12-24 RX ORDER — CEFAZOLIN SODIUM 1 G/3ML
INJECTION, POWDER, FOR SOLUTION INTRAMUSCULAR; INTRAVENOUS
Status: DISCONTINUED | OUTPATIENT
Start: 2024-12-24 | End: 2024-12-24 | Stop reason: SDUPTHER

## 2024-12-24 RX ORDER — ONDANSETRON 2 MG/ML
4 INJECTION INTRAMUSCULAR; INTRAVENOUS EVERY 6 HOURS PRN
Status: DISCONTINUED | OUTPATIENT
Start: 2024-12-24 | End: 2024-12-26 | Stop reason: HOSPADM

## 2024-12-24 RX ORDER — FENTANYL CITRATE 50 UG/ML
INJECTION, SOLUTION INTRAMUSCULAR; INTRAVENOUS
Status: DISCONTINUED | OUTPATIENT
Start: 2024-12-24 | End: 2024-12-24 | Stop reason: SDUPTHER

## 2024-12-24 RX ORDER — OXYTOCIN 10 [USP'U]/ML
INJECTION, SOLUTION INTRAMUSCULAR; INTRAVENOUS
Status: DISCONTINUED
Start: 2024-12-24 | End: 2024-12-24 | Stop reason: WASHOUT

## 2024-12-24 RX ORDER — PROPOFOL 10 MG/ML
INJECTION, EMULSION INTRAVENOUS
Status: DISCONTINUED | OUTPATIENT
Start: 2024-12-24 | End: 2024-12-24 | Stop reason: SDUPTHER

## 2024-12-24 RX ORDER — SODIUM CHLORIDE 9 MG/ML
INJECTION, SOLUTION INTRAVENOUS PRN
Status: DISCONTINUED | OUTPATIENT
Start: 2024-12-24 | End: 2024-12-26 | Stop reason: HOSPADM

## 2024-12-24 RX ORDER — TRANEXAMIC ACID 100 MG/ML
INJECTION, SOLUTION INTRAVENOUS
Status: COMPLETED
Start: 2024-12-24 | End: 2024-12-24

## 2024-12-24 RX ORDER — MIDAZOLAM HYDROCHLORIDE 1 MG/ML
INJECTION, SOLUTION INTRAMUSCULAR; INTRAVENOUS
Status: DISCONTINUED | OUTPATIENT
Start: 2024-12-24 | End: 2024-12-24 | Stop reason: SDUPTHER

## 2024-12-24 RX ORDER — SODIUM CHLORIDE 0.9 % (FLUSH) 0.9 %
5-40 SYRINGE (ML) INJECTION PRN
Status: DISCONTINUED | OUTPATIENT
Start: 2024-12-24 | End: 2024-12-26 | Stop reason: HOSPADM

## 2024-12-24 RX ORDER — DIPHENHYDRAMINE HCL 25 MG
25 TABLET ORAL EVERY 6 HOURS PRN
Status: DISCONTINUED | OUTPATIENT
Start: 2024-12-24 | End: 2024-12-26 | Stop reason: HOSPADM

## 2024-12-24 RX ORDER — TRANEXAMIC ACID 100 MG/ML
INJECTION, SOLUTION INTRAVENOUS
Status: DISCONTINUED | OUTPATIENT
Start: 2024-12-24 | End: 2024-12-24 | Stop reason: SDUPTHER

## 2024-12-24 RX ORDER — CARBOPROST TROMETHAMINE 250 UG/ML
INJECTION, SOLUTION INTRAMUSCULAR
Status: DISCONTINUED
Start: 2024-12-24 | End: 2024-12-24 | Stop reason: WASHOUT

## 2024-12-24 RX ORDER — MISOPROSTOL 200 UG/1
TABLET ORAL
Status: DISCONTINUED
Start: 2024-12-24 | End: 2024-12-24 | Stop reason: WASHOUT

## 2024-12-24 RX ORDER — NALBUPHINE HYDROCHLORIDE 10 MG/ML
10 INJECTION INTRAMUSCULAR; INTRAVENOUS; SUBCUTANEOUS
Status: DISCONTINUED | OUTPATIENT
Start: 2024-12-24 | End: 2024-12-24 | Stop reason: HOSPADM

## 2024-12-24 RX ORDER — OXYCODONE HYDROCHLORIDE 5 MG/1
10 TABLET ORAL EVERY 6 HOURS PRN
Status: DISCONTINUED | OUTPATIENT
Start: 2024-12-24 | End: 2024-12-26 | Stop reason: HOSPADM

## 2024-12-24 RX ORDER — MODIFIED LANOLIN
OINTMENT (GRAM) TOPICAL PRN
Status: DISCONTINUED | OUTPATIENT
Start: 2024-12-24 | End: 2024-12-26 | Stop reason: HOSPADM

## 2024-12-24 RX ORDER — ACETAMINOPHEN 500 MG
1000 TABLET ORAL EVERY 8 HOURS SCHEDULED
Status: DISCONTINUED | OUTPATIENT
Start: 2024-12-24 | End: 2024-12-26 | Stop reason: HOSPADM

## 2024-12-24 RX ORDER — OXYCODONE HYDROCHLORIDE 5 MG/1
5 TABLET ORAL EVERY 4 HOURS PRN
Status: DISCONTINUED | OUTPATIENT
Start: 2024-12-24 | End: 2024-12-26 | Stop reason: HOSPADM

## 2024-12-24 RX ORDER — DOCUSATE SODIUM 100 MG/1
100 CAPSULE, LIQUID FILLED ORAL 2 TIMES DAILY
Status: DISCONTINUED | OUTPATIENT
Start: 2024-12-24 | End: 2024-12-26 | Stop reason: HOSPADM

## 2024-12-24 RX ORDER — LIDOCAINE HYDROCHLORIDE 20 MG/ML
INJECTION, SOLUTION INFILTRATION; PERINEURAL
Status: DISCONTINUED | OUTPATIENT
Start: 2024-12-24 | End: 2024-12-24 | Stop reason: SDUPTHER

## 2024-12-24 RX ORDER — DEXAMETHASONE SODIUM PHOSPHATE 10 MG/ML
INJECTION INTRAMUSCULAR; INTRAVENOUS
Status: DISCONTINUED | OUTPATIENT
Start: 2024-12-24 | End: 2024-12-24 | Stop reason: SDUPTHER

## 2024-12-24 RX ORDER — ZOLPIDEM TARTRATE 5 MG/1
5 TABLET ORAL NIGHTLY PRN
Status: DISCONTINUED | OUTPATIENT
Start: 2024-12-24 | End: 2024-12-26 | Stop reason: HOSPADM

## 2024-12-24 RX ORDER — ONDANSETRON 2 MG/ML
INJECTION INTRAMUSCULAR; INTRAVENOUS
Status: DISCONTINUED | OUTPATIENT
Start: 2024-12-24 | End: 2024-12-24 | Stop reason: SDUPTHER

## 2024-12-24 RX ORDER — SODIUM CHLORIDE 9 MG/ML
INJECTION, SOLUTION INTRAVENOUS
Status: COMPLETED
Start: 2024-12-24 | End: 2024-12-24

## 2024-12-24 RX ORDER — EPHEDRINE SULFATE/0.9% NACL/PF 25 MG/5 ML
SYRINGE (ML) INTRAVENOUS
Status: DISCONTINUED | OUTPATIENT
Start: 2024-12-24 | End: 2024-12-24 | Stop reason: SDUPTHER

## 2024-12-24 RX ORDER — SODIUM CHLORIDE, SODIUM LACTATE, POTASSIUM CHLORIDE, AND CALCIUM CHLORIDE .6; .31; .03; .02 G/100ML; G/100ML; G/100ML; G/100ML
500 INJECTION, SOLUTION INTRAVENOUS ONCE
Status: COMPLETED | OUTPATIENT
Start: 2024-12-24 | End: 2024-12-24

## 2024-12-24 RX ORDER — KETOROLAC TROMETHAMINE 30 MG/ML
30 INJECTION, SOLUTION INTRAMUSCULAR; INTRAVENOUS EVERY 6 HOURS PRN
Status: ACTIVE | OUTPATIENT
Start: 2024-12-24 | End: 2024-12-25

## 2024-12-24 RX ORDER — KETOROLAC TROMETHAMINE 30 MG/ML
INJECTION, SOLUTION INTRAMUSCULAR; INTRAVENOUS
Status: DISCONTINUED | OUTPATIENT
Start: 2024-12-24 | End: 2024-12-24 | Stop reason: SDUPTHER

## 2024-12-24 RX ORDER — ACETAMINOPHEN 500 MG
1000 TABLET ORAL ONCE
Status: DISCONTINUED | OUTPATIENT
Start: 2024-12-24 | End: 2024-12-26 | Stop reason: HOSPADM

## 2024-12-24 RX ORDER — METHYLERGONOVINE MALEATE 0.2 MG/ML
INJECTION INTRAVENOUS
Status: DISCONTINUED
Start: 2024-12-24 | End: 2024-12-24 | Stop reason: WASHOUT

## 2024-12-24 RX ORDER — SIMETHICONE 80 MG
80 TABLET,CHEWABLE ORAL EVERY 6 HOURS PRN
Status: DISCONTINUED | OUTPATIENT
Start: 2024-12-24 | End: 2024-12-26 | Stop reason: HOSPADM

## 2024-12-24 RX ORDER — IBUPROFEN 800 MG/1
800 TABLET, FILM COATED ORAL EVERY 8 HOURS
Status: DISCONTINUED | OUTPATIENT
Start: 2024-12-24 | End: 2024-12-26 | Stop reason: HOSPADM

## 2024-12-24 RX ORDER — ONDANSETRON 4 MG/1
4 TABLET, ORALLY DISINTEGRATING ORAL EVERY 6 HOURS PRN
Status: DISCONTINUED | OUTPATIENT
Start: 2024-12-24 | End: 2024-12-26 | Stop reason: HOSPADM

## 2024-12-24 RX ORDER — ONDANSETRON 2 MG/ML
4 INJECTION INTRAMUSCULAR; INTRAVENOUS EVERY 6 HOURS PRN
Status: DISCONTINUED | OUTPATIENT
Start: 2024-12-24 | End: 2024-12-24 | Stop reason: HOSPADM

## 2024-12-24 RX ADMIN — ONDANSETRON 4 MG: 2 INJECTION INTRAMUSCULAR; INTRAVENOUS at 08:32

## 2024-12-24 RX ADMIN — FENTANYL CITRATE 50 MCG: 50 INJECTION, SOLUTION INTRAMUSCULAR; INTRAVENOUS at 14:14

## 2024-12-24 RX ADMIN — CEFAZOLIN 2 G: 1 INJECTION, POWDER, FOR SOLUTION INTRAMUSCULAR; INTRAVENOUS at 14:15

## 2024-12-24 RX ADMIN — EPHEDRINE SULFATE 25 MG: 5 INJECTION INTRAVENOUS at 14:29

## 2024-12-24 RX ADMIN — MIDAZOLAM HYDROCHLORIDE 2 MG: 1 INJECTION, SOLUTION INTRAMUSCULAR; INTRAVENOUS at 14:05

## 2024-12-24 RX ADMIN — HYDROMORPHONE HYDROCHLORIDE 1 MG: 1 INJECTION, SOLUTION INTRAMUSCULAR; INTRAVENOUS; SUBCUTANEOUS at 14:56

## 2024-12-24 RX ADMIN — Medication 999 ML/HR: at 14:39

## 2024-12-24 RX ADMIN — Medication 30 MG: at 14:42

## 2024-12-24 RX ADMIN — SODIUM CHLORIDE: 9 INJECTION, SOLUTION INTRAVENOUS at 14:07

## 2024-12-24 RX ADMIN — SODIUM CHLORIDE, POTASSIUM CHLORIDE, SODIUM LACTATE AND CALCIUM CHLORIDE 500 ML: 600; 310; 30; 20 INJECTION, SOLUTION INTRAVENOUS at 08:23

## 2024-12-24 RX ADMIN — DOCUSATE SODIUM 100 MG: 100 CAPSULE, LIQUID FILLED ORAL at 23:55

## 2024-12-24 RX ADMIN — FAMOTIDINE 20 MG: 20 TABLET, FILM COATED ORAL at 23:55

## 2024-12-24 RX ADMIN — WATER 2000 MG: 1 INJECTION INTRAMUSCULAR; INTRAVENOUS; SUBCUTANEOUS at 23:54

## 2024-12-24 RX ADMIN — FENTANYL CITRATE 50 MCG: 50 INJECTION, SOLUTION INTRAMUSCULAR; INTRAVENOUS at 14:20

## 2024-12-24 RX ADMIN — NALBUPHINE HYDROCHLORIDE 10 MG: 10 INJECTION INTRAMUSCULAR; INTRAVENOUS; SUBCUTANEOUS at 09:40

## 2024-12-24 RX ADMIN — PROPOFOL 200 MG: 10 INJECTION, EMULSION INTRAVENOUS at 14:12

## 2024-12-24 RX ADMIN — SODIUM CHLORIDE, POTASSIUM CHLORIDE, SODIUM LACTATE AND CALCIUM CHLORIDE: 600; 310; 30; 20 INJECTION, SOLUTION INTRAVENOUS at 15:24

## 2024-12-24 RX ADMIN — SODIUM CHLORIDE: 9 INJECTION, SOLUTION INTRAVENOUS at 14:33

## 2024-12-24 RX ADMIN — ONDANSETRON 4 MG: 2 INJECTION INTRAMUSCULAR; INTRAVENOUS at 14:22

## 2024-12-24 RX ADMIN — TRANEXAMIC ACID 1000 MG: 1 INJECTION, SOLUTION INTRAVENOUS at 14:48

## 2024-12-24 RX ADMIN — HYDROMORPHONE HYDROCHLORIDE 1 MG: 1 INJECTION, SOLUTION INTRAMUSCULAR; INTRAVENOUS; SUBCUTANEOUS at 14:39

## 2024-12-24 RX ADMIN — LIDOCAINE HYDROCHLORIDE 50 MG: 20 INJECTION, SOLUTION INFILTRATION; PERINEURAL at 14:12

## 2024-12-24 RX ADMIN — DEXAMETHASONE SODIUM PHOSPHATE 10 MG: 10 INJECTION INTRAMUSCULAR; INTRAVENOUS at 14:22

## 2024-12-24 ASSESSMENT — PAIN SCALES - GENERAL: PAINLEVEL_OUTOF10: 7

## 2024-12-24 NOTE — FLOWSHEET NOTE
Received via squad . Pt was at home and while in the tub for a brief time and felt she needed to have a BM and sat on the toilet pushing. Her mother called the squad and pt delivered in the squad. Pt unsure if her water broke in the tub or on the toilet. Infant skin to skin to mom cord clamped placenta undelivered. Dr Tsai and Dr Hoskins aware and in attendance when pt arrived. Room pre-heated.

## 2024-12-24 NOTE — FLOWSHEET NOTE
Dr Tsai reviewed labs made aware pt still uncomfortable and nauseated.New order obtained. Mother here to see pt.

## 2024-12-24 NOTE — FLOWSHEET NOTE
SVE performed after Dr Tsai left the bedside. Nor bloody show noted. Pt unable to trace moving around in bed.

## 2024-12-24 NOTE — FLOWSHEET NOTE
Pt arrived with C/O abdominal pain and suprapubic tenderness with a nurses touch. Denies recent intercourse, leaking of fluid, or vaginal bleeding. Unable to provide UA at this time. Admits to vomiting since 2 am. Unable to keep even fluids down. Did admit to using Zofran this am at 1:30 am. Rates her pain a 7.

## 2024-12-24 NOTE — FLOWSHEET NOTE
Dr. Tsai at bedside. Assessing patient. Made aware that patients pulse is elevated in the lower 140's. No new orders received

## 2024-12-24 NOTE — LACTATION NOTE
This note was copied from a baby's chart.  -initial lactation visit  -baby skin to skin upon entering room  -assisted in latching to both breast in cross cradle hold  -deep latch with rhythmic suck noted  -colostrum easily hand expressible  -provided with written breastfeeding education materials and reviewed  -visit kept brief as mom going back to OR for d&c  -offered support and encouragement

## 2024-12-24 NOTE — FLOWSHEET NOTE
Dr Tsai in to see pt. Pt states she is still having pain. Threw out pop sickle only ate a small portion. Dr Tsai to contact [facility] and will update pt.

## 2024-12-24 NOTE — ED TRIAGE NOTES
Department of Obstetrics and Gynecology  Labor and Delivery  Deandre Tsai MD: OB Triage Note      SUBJECTIVE:  Patient is a 25 yo  female @ 38.5 weeks with EDC 2025 s/b LMP 3/28/24 c/b 5.6 week sono.   She presents with N/V and diarrhea since 1:30 am. She was unable to keep food or water down. She ate french fries last night. She took Zofran prior to arrival.   She denies any sick contacts. She denies any recent coitus. She reports +FM, denies LOF, VB, or VD. She does report contractions that are worsening.     OBJECTIVE    ROS:  Gen: Fatigue  CV: Negative  Lungs: Negative   Abdomen: +FM, N/V, cramping  Pelvis: pressure  Rest of systems reviewed and found to be negative.    Recent Vitals     Most Recent Value 2024  0741 2024  0738 2024  0737   BP: 140/81 Abnormal    as of 2024 140/81 Abnormal   112/72  139/78      R arm with a contraction L arm R arm   Temp: 97.8 °F (36.6 °C)  as of 2024 -- -- 97.8 °F (36.6 °C)   Pulse: 88  as of 2024 88 83 80   Height: 162.6 cm (5' 4\")  as of 2024 -- -- --   Weight - Scale: 76.7 kg (169 lb)  as of 2024 -- -- --   Body Mass Index: 29.01 kg/m² Abnormal   162.6 cm (5' 4\")  as of 2024  76.7 kg (169 lb)  as of 2024      Respirations: 22  as of 2024 -- -- 22   SpO2: 99%  as of 2024 --       PE:   Gen: A x O x 3, in Mild Distress   Abdomen: soft, gravid, +FM  Cervix:             Dilation:  1         Effacement:  70         Station:  -1            Fetal heart rate:  Category I (REACTIVE)-duration 1 hour       Baseline Heart Rate: 130s R       Accelerations:  present       Decelerations: none       Variability:  moderate    Contraction frequency: q 2-5 minutes     DATA:  Results     Component Value Units   Urine Drug Screen [[id number]]    Collected: 24    Updated: 24    Specimen Source: Urine     Amphetamine Screen, Ur Neg    Barbiturate Screen, Ur Neg    Benzodiazepine Screen, Urine Neg

## 2024-12-24 NOTE — FLOWSHEET NOTE
Pt still having urge to vomit small emesis noted of yellow liquid. IV fluid bolus started. Dr Tsai aware and orders obtained. Pt to inform this nurse when able to provide urine sample.

## 2024-12-24 NOTE — FLOWSHEET NOTE
Call placed to LINDSEY LEVY message left on voice mail to return call. Dr Tsai aware of no cervical change, no bloody show noted on glove.

## 2024-12-24 NOTE — ANESTHESIA POSTPROCEDURE EVALUATION
Department of Anesthesiology  Postprocedure Note    Patient: Erum Bermudez  MRN: [id number]  YOB: 2000  Date of evaluation: 12/24/2024    Procedure Summary       Date: 12/24/24 Room / Location: [facility]    Anesthesia Start: 1407 Anesthesia Stop: 1509    Procedure: DILATATION AND CURETTAGE Diagnosis:       Retained portions of placenta      (Retained portions of placenta [O73.1])    Surgeons: Deandre Castle MD Responsible Provider: Cindy Denton APRN - CRNA    Anesthesia Type: Not recorded ASA Status: Not recorded            Anesthesia Type: No value filed.    Hair Phase I:      Hair Phase II:      Anesthesia Post Evaluation    Patient location during evaluation: bedside  Patient participation: complete - patient participated  Level of consciousness: awake and awake and alert  Pain score: 0  Airway patency: patent  Nausea & Vomiting: no nausea and no vomiting  Cardiovascular status: blood pressure returned to baseline and hemodynamically stable  Respiratory status: acceptable  Hydration status: euvolemic  Pain management: adequate        No notable events documented.

## 2024-12-24 NOTE — L&D DELIVERY SUMMARY NOTE
Department of Obstetrics and Gynecology  Spontaneous Vaginal Delivery Note      Pre-operative Diagnosis:  Term Pregnancy @ 38.5 weeks, Precipitous Delivery @ Home    Post-operative Diagnosis:  Same as above, S/p Precipitous Spontaneous Vaginal Delivery     Information for the patient's :  Christina Bermudez [[id number]]                  Infant Wt: 5 lbs, 15.7 oz (2714 grams )  Information for the patient's :  Christina Bermudez [[id number]]         APGARS:   8/9  Information for the patient's :  Christina Bermudez [[id number]]         Anesthesia:  none        Delivery Summary:  On 2024 @ 1246, this 25 yo G1 now P1 female delivered precipitously at home. She was brought in by squad with baby skin to skin and placenta in situ.  There was no report of delayed cord clamping or cord blood collection. Placental membranes, and blood clot was delivered shortly after arrival, but no placental tissue was expressed at bedside. Patient counseled on necessity of Suction D&C for retained placenta. Please refer to operative notes for further details.   At end of procedure, the fundus was firm with massage and IV Pitocin and IV TXA. There were no cervical, vaginal, or perineal lacerations. Female Infant weighs 2714 grams  ( 5 lbs.15.7 oz) with Apgars scores of 8 at 1 minute and 9 at 5 minutes ( 8/9 ).  ml. Both mom and infant are recovering well. All instruments, needled and sponges were counted and found to be correct x 2.     Specimen:  Placenta sent to pathology         Condition:  mother stable    Blood Type and Rh: O NEG        Rubella Immunity Status:   Immune           Infant Feeding:  Breast     Attending Attestation: I was present and scrubbed for the key portions of the procedure (delivery of placenta only).

## 2024-12-24 NOTE — FORENSIC NURSE
CRISS White at bedside. Notified her of patients elevated pulse in the 140's. Order received to give a LR bolus.

## 2024-12-24 NOTE — FLOWSHEET NOTE
Discharge instructions provided. Reviewed labor precautions with pt and her mother. Pt staying with mother in [place] to remain close to hospital.

## 2024-12-24 NOTE — H&P
Department of Obstetrics and Gynecology  Deandre Tsai MD: Obstetrics History and Physical        CHIEF COMPLAINT:  Delivery at Home    HISTORY OF PRESENT ILLNESS:    Patient is a 25 yo  female @ 38.5 weeks with EDC 2025 s/b LMP 3/28/24 c/b 5.6 week sono.   She was seen earlier today in triage for N/V/diarrhea. She was treated with IV fluids, zofran and nubain but had no cervical change after observation for 3 hours. She was discharged home but returned after precipitously delivering at home. EMS called and patient delivered in squad prior to arrival. Placenta was in-situ and baby was skin to skin.     DATES:    Last Menstrual Period:  3/28/24  Estimated Due Date:  2025    PRENATAL CARE:    Provider:  Laurie Piedra CNM    Prenatal Results    1st Trimester    Test Value Reference Range Date Time   ABO/Rh  O NEG  24 1608   Antibody       HCT  32.7 % Low Abnormal  37.0 - 47.0 24 1608   HGB  11.4 g/dL Low Abnormal  12.0 - 16.0 24 1608   Rubella       RPR  Non-reactive Non-reactive 10/18/24 1049      Non-reactive Non-reactive 24 1608   Urine Prot       HBsAg       HIV       Gonorrhea       Chlamydia       TSH  0.771 uIU/mL 0.440 - 3.860 24 1608   TB       Pap  (See Report)  23 1213   2nd Trimester    Test Value Reference Range Date Time   HCT       HGB       Glucose  92 mg/dL 70 - 99 24 0812   3rd Trimester    Test Value Reference Range Date Time   HCT  34.6 % Low Abnormal  37.0 - 47.0 24 0811      32.7 % Low Abnormal  37.0 - 47.0 10/18/24 1049   HGB  12.5 g/dL 12.0 - 16.0 24 0811      11.4 g/dL Low Abnormal  12.0 - 16.0 10/18/24 1049   GBS        Genetic Screening    Test Value Reference Range Date Time   Cystic Fibrosis       Canavan       Thalessemia       Sickle Cell       Paolo-Sac       External Labs      Test Value Reference Range Date Time   ABO       Rh Factor       Rhogam       HIV       RPR       Rubella Titer       Hepatitis B       C.

## 2024-12-24 NOTE — PROCEDURES
Gyn Service    Operative Report      Pt Name: Erum Bermudez  MRN: [id number]  YOB: 2000  Procedure Performed By: Deandre Tsai MD        Pre-operative Diagnosis:  Retained Placenta, Fundal Placental Accreta    Post-operative Diagnosis:  SAME    Procedure:  SUCTION D AND C    Surgeon:  Deandre Tsai MD    Anesthesia:  GENERAL     Estimated blood loss:   > 500 ml    Findings:  portions of placenta    Complications:  NONE      Patient was taken to the operative room and placed under general anesthesia, in the dorsal lithotomy position and prepped and draped in the normal sterile fashion. The cervix was grasped with single tooth tenaculum. The # 12 curved curette was placed gently to the fundus. A suction curettage took place with 3  passes of the curette for a copious amount of tissue. Intrauterine Manual exploration verified the entire adhered fundal placenta. Placenta was extracted in pieces secondary to firm adhesion to fundal wall. Adherent Fundal placental tissue was removed manually secondary to large size of retained parts. A sharp curettage took place until a gritty texture was noted throughout the cavity. A last pass with the suction curette took place to assure all clots and debris are removed. Patient given IV Pitocin and  IV TXA and hemostasis was assured. The patient was awakened from anesthesia and taken to the recovery room in good condition.  Total QBL from Delivery as well as D^C: 825 cc

## 2024-12-25 LAB
ABO/RH: NORMAL
ANISOCYTOSIS BLD QL SMEAR: ABNORMAL
ANTIBODY SCREEN: NORMAL
BASOPHILS # BLD: 0 K/UL (ref 0–0.2)
BASOPHILS NFR BLD: 0.1 %
EOSINOPHIL # BLD: 0 K/UL (ref 0–0.7)
EOSINOPHIL NFR BLD: 0 %
ERYTHROCYTE [DISTWIDTH] IN BLOOD BY AUTOMATED COUNT: 13 % (ref 11.5–14.5)
FETAL SCREEN: NORMAL
HCT VFR BLD AUTO: 22.7 % (ref 37–47)
HGB BLD-MCNC: 8 G/DL (ref 12–16)
HYPOCHROMIA BLD QL SMEAR: ABNORMAL
LYMPHOCYTES # BLD: 2.1 K/UL (ref 1–4.8)
LYMPHOCYTES NFR BLD: 11 %
MCH RBC QN AUTO: 30.4 PG (ref 27–31.3)
MCHC RBC AUTO-ENTMCNC: 35.2 % (ref 33–37)
MCV RBC AUTO: 86.3 FL (ref 79.4–94.8)
MICROCYTES BLD QL SMEAR: ABNORMAL
MONOCYTES # BLD: 0.8 K/UL (ref 0.2–0.8)
MONOCYTES NFR BLD: 3.8 %
NEUTROPHILS # BLD: 16.7 K/UL (ref 1.4–6.5)
NEUTS SEG NFR BLD: 86 %
PLATELET # BLD AUTO: 224 K/UL (ref 130–400)
PLATELET BLD QL SMEAR: ADEQUATE
POIKILOCYTOSIS BLD QL SMEAR: ABNORMAL
RBC # BLD AUTO: 2.63 M/UL (ref 4.2–5.4)
SLIDE REVIEW: ABNORMAL
SMUDGE CELLS BLD QL SMEAR: 7.6
WBC # BLD AUTO: 19.4 K/UL (ref 4.8–10.8)

## 2024-12-25 PROCEDURE — 88307 TISSUE EXAM BY PATHOLOGIST: CPT

## 2024-12-25 PROCEDURE — 6360000002 HC RX W HCPCS: Performed by: OBSTETRICS & GYNECOLOGY

## 2024-12-25 PROCEDURE — 1220000000 HC SEMI PRIVATE OB R&B

## 2024-12-25 PROCEDURE — 2500000003 HC RX 250 WO HCPCS: Performed by: OBSTETRICS & GYNECOLOGY

## 2024-12-25 PROCEDURE — 99024 POSTOP FOLLOW-UP VISIT: CPT | Performed by: ADVANCED PRACTICE MIDWIFE

## 2024-12-25 PROCEDURE — 36415 COLL VENOUS BLD VENIPUNCTURE: CPT

## 2024-12-25 PROCEDURE — 85025 COMPLETE CBC W/AUTO DIFF WBC: CPT

## 2024-12-25 PROCEDURE — 6370000000 HC RX 637 (ALT 250 FOR IP): Performed by: OBSTETRICS & GYNECOLOGY

## 2024-12-25 RX ADMIN — DOCUSATE SODIUM 100 MG: 100 CAPSULE, LIQUID FILLED ORAL at 21:18

## 2024-12-25 RX ADMIN — ONDANSETRON 4 MG: 4 TABLET, ORALLY DISINTEGRATING ORAL at 18:22

## 2024-12-25 RX ADMIN — FERROUS SULFATE TAB 325 MG (65 MG ELEMENTAL FE) 325 MG: 325 (65 FE) TAB at 08:13

## 2024-12-25 RX ADMIN — WATER 2000 MG: 1 INJECTION INTRAMUSCULAR; INTRAVENOUS; SUBCUTANEOUS at 08:14

## 2024-12-25 RX ADMIN — ACETAMINOPHEN 1000 MG: 500 TABLET ORAL at 03:07

## 2024-12-25 RX ADMIN — DOCUSATE SODIUM 100 MG: 100 CAPSULE, LIQUID FILLED ORAL at 08:14

## 2024-12-25 RX ADMIN — HUMAN RHO(D) IMMUNE GLOBULIN 300 MCG: 1500 SOLUTION INTRAMUSCULAR; INTRAVENOUS at 08:40

## 2024-12-25 RX ADMIN — ACETAMINOPHEN 1000 MG: 500 TABLET ORAL at 18:24

## 2024-12-25 RX ADMIN — ACETAMINOPHEN 1000 MG: 500 TABLET ORAL at 21:18

## 2024-12-25 RX ADMIN — IBUPROFEN 800 MG: 800 TABLET, FILM COATED ORAL at 03:07

## 2024-12-25 ASSESSMENT — PAIN DESCRIPTION - LOCATION
LOCATION: PERINEUM
LOCATION: ABDOMEN

## 2024-12-25 ASSESSMENT — PAIN SCALES - GENERAL
PAINLEVEL_OUTOF10: 6
PAINLEVEL_OUTOF10: 2
PAINLEVEL_OUTOF10: 6
PAINLEVEL_OUTOF10: 6

## 2024-12-25 ASSESSMENT — PAIN DESCRIPTION - DESCRIPTORS: DESCRIPTORS: ACHING;CRAMPING;DISCOMFORT

## 2024-12-25 ASSESSMENT — PAIN DESCRIPTION - ORIENTATION: ORIENTATION: LOWER

## 2024-12-25 NOTE — PROGRESS NOTES
Department of Obstetrics and Gynecology  Labor and Delivery   Vaginal Delivery Post-Partum Progress Note      Subjective:     Erum Bermudez is a 24 y.o. female  at 38w5d    Postpartum Day 1: Vaginal Delivery on 24 with a baby girl.    The patient feels well. She denies emotional concerns, her pain is well controlled with current medications.  She is ambulating well and is tolerating a normal diet.     The baby is well and feeding well.    Objective:      Patient Vitals for the past 16 hrs:   BP Temp Temp src Pulse Resp SpO2   24 0816 120/65 98.2 °F (36.8 °C) Oral 90 16 99 %   24 0425 (!) 100/54 98.5 °F (36.9 °C) Oral (!) 106 18 --   24 0007 (!) 122/54 98.2 °F (36.8 °C) Oral 97 18 --   24 2018 (!) 121/52 98.2 °F (36.8 °C) Oral (!) 108 16 100 %       General:    alert, cooperative, and no distress   Bowel Sounds:  active   Lochia:  appropriate   Uterine Fundus:   firm, with appropriate involution   Perineum:  Episiotomy:  None   Laceration:  None   DVT Evaluation:  No evidence of DVT seen on physical exam.     Labs:     Blood Type/Rh:  O NEG       Group B Strep Culture   Date Value Ref Range Status   2024   Final    Rule Out Grp.B Strep:  NEGATIVE FOR GROUP B STREPTOCOCCI  Performed at [facility] [address] [place] [id number] ([phone]       Hemoglobin   Date Value Ref Range Status   2024 8.0 (L) 12.0 - 16.0 g/dL Final   2024 12.5 12.0 - 16.0 g/dL Final     Hematocrit   Date Value Ref Range Status   2024 22.7 (L) 37.0 - 47.0 % Final   2024 34.6 (L) 37.0 - 47.0 % Final     Rubella Antibody IgG   Date Value Ref Range Status   2024 >500.0 IU/mL Final     Comment:     Patient's result indicates immunity.  Default Normal Ranges    >=10 Presumed Immune  <10  Presumed Not immune         Assessment:     Status post vaginal delivery  O NEG  Rubella Immune  breast feeding  Doing well    Plan:     Anticipate discharge on 24 with

## 2024-12-25 NOTE — PLAN OF CARE
Problem: Pain  Goal: Verbalizes/displays adequate comfort level or baseline comfort level  2024 by Eliza Espinoza RN  Outcome: Progressing  2024 181 by Ryan Combs RN  Outcome: Progressing     Problem: Postpartum  Goal: Experiences normal postpartum course  Description:  Postpartum OB-Pregnancy care plan goal which identifies if the mother is experiencing a normal postpartum course  2024 by Eliza Espinoza RN  Outcome: Progressing  2024 by Ryan Combs RN  Outcome: Progressing  Goal: Appropriate maternal -  bonding  Description:  Postpartum OB-Pregnancy care plan goal which identifies if the mother and  are bonding appropriately  2024 by Eliza Espinoza RN  Outcome: Progressing  2024 by Ryan Combs RN  Outcome: Progressing  Goal: Establishment of infant feeding pattern  Description:  Postpartum OB-Pregnancy care plan goal which identifies if the mother is establishing a feeding pattern with their   2024 by Eliza Espinoza RN  Outcome: Progressing  2024 181 by Ryan Combs RN  Outcome: Progressing     Problem: Infection - Adult  Goal: Absence of infection at discharge  2024 by Eliza Espinoza RN  Outcome: Progressing  2024 by Ryan Combs RN  Outcome: Progressing  Goal: Absence of infection during hospitalization  2024 by Eliza Espinoza RN  Outcome: Progressing  2024 181 by Ryan Combs RN  Outcome: Progressing  Goal: Absence of fever/infection during anticipated neutropenic period  2024 by Eliza Espinoza RN  Outcome: Progressing  2024 181 by Ryan Combs RN  Outcome: Progressing     Problem: Safety - Adult  Goal: Free from fall injury  2024 by Eliza Espinoza RN  Outcome: Progressing  2024 by Ryan Combs RN  Outcome: Progressing     Problem: Chronic Conditions and Co-morbidities  Goal: Patient's chronic conditions and  co-morbidity symptoms are monitored and maintained or improved  12/24/2024 2026 by Eliza Espinoza, RN  Outcome: Progressing  12/24/2024 1818 by Ryan Combs, RN  Outcome: Progressing

## 2024-12-25 NOTE — PLAN OF CARE
Problem: Pain  Goal: Verbalizes/displays adequate comfort level or baseline comfort level  2024 09 by Florencia Saha RN  Outcome: Progressing  2024 by Eliza Espinoza RN  Outcome: Progressing     Problem: Postpartum  Goal: Experiences normal postpartum course  Description:  Postpartum OB-Pregnancy care plan goal which identifies if the mother is experiencing a normal postpartum course  2024 09 by Florencia Saha RN  Outcome: Progressing  2024 by Eliza Espinoza RN  Outcome: Progressing  Goal: Appropriate maternal -  bonding  Description:  Postpartum OB-Pregnancy care plan goal which identifies if the mother and  are bonding appropriately  2024 09 by Florencia Saha RN  Outcome: Progressing  2024 by Eliza Espinoza RN  Outcome: Progressing  Goal: Establishment of infant feeding pattern  Description:  Postpartum OB-Pregnancy care plan goal which identifies if the mother is establishing a feeding pattern with their   2024 09 by Florencia Saha RN  Outcome: Progressing  2024 by Eliza Espinoza RN  Outcome: Progressing     Problem: Infection - Adult  Goal: Absence of infection at discharge  2024 09 by Florencia Saha RN  Outcome: Progressing  2024 by Eliza Espinoza RN  Outcome: Progressing  Goal: Absence of infection during hospitalization  2024 09 by Florencia Saha RN  Outcome: Progressing  2024 by Eliza Espinoza RN  Outcome: Progressing  Goal: Absence of fever/infection during anticipated neutropenic period  2024 09 by Florencia Saha RN  Outcome: Progressing  2024 by Eliza Espinoza RN  Outcome: Progressing     Problem: Safety - Adult  Goal: Free from fall injury  2024 by Florencia Saha RN  Outcome: Progressing  2024 by Eliza Espinoza RN  Outcome: Progressing     Problem: Chronic Conditions and

## 2024-12-25 NOTE — FLOWSHEET NOTE
Patient up to the bathroom with stephanie steady. Patient was able to void. Patient gait was steady. Patient tolerated well.

## 2024-12-26 VITALS
RESPIRATION RATE: 18 BRPM | TEMPERATURE: 98.6 F | SYSTOLIC BLOOD PRESSURE: 108 MMHG | DIASTOLIC BLOOD PRESSURE: 63 MMHG | HEART RATE: 96 BPM | OXYGEN SATURATION: 100 %

## 2024-12-26 PROBLEM — E86.0 DEHYDRATION DURING PREGNANCY: Status: RESOLVED | Noted: 2024-12-24 | Resolved: 2024-12-26

## 2024-12-26 PROBLEM — Z3A.38 PREGNANCY WITH 38 COMPLETED WEEKS GESTATION: Status: RESOLVED | Noted: 2024-12-24 | Resolved: 2024-12-26

## 2024-12-26 PROBLEM — O99.280 DEHYDRATION DURING PREGNANCY: Status: RESOLVED | Noted: 2024-12-24 | Resolved: 2024-12-26

## 2024-12-26 PROBLEM — K29.70 GASTRITIS: Status: RESOLVED | Noted: 2024-12-24 | Resolved: 2024-12-26

## 2024-12-26 PROBLEM — O21.9 NAUSEA AND VOMITING IN PREGNANCY: Status: RESOLVED | Noted: 2024-12-24 | Resolved: 2024-12-26

## 2024-12-26 PROCEDURE — 99024 POSTOP FOLLOW-UP VISIT: CPT | Performed by: ADVANCED PRACTICE MIDWIFE

## 2024-12-26 PROCEDURE — 6370000000 HC RX 637 (ALT 250 FOR IP): Performed by: OBSTETRICS & GYNECOLOGY

## 2024-12-26 PROCEDURE — 7200000001 HC VAGINAL DELIVERY

## 2024-12-26 RX ORDER — IBUPROFEN 800 MG/1
800 TABLET, FILM COATED ORAL EVERY 8 HOURS PRN
Qty: 60 TABLET | Refills: 1 | Status: SHIPPED | OUTPATIENT
Start: 2024-12-26 | End: 2025-03-26

## 2024-12-26 RX ADMIN — FAMOTIDINE 20 MG: 20 TABLET, FILM COATED ORAL at 08:53

## 2024-12-26 RX ADMIN — IBUPROFEN 800 MG: 800 TABLET, FILM COATED ORAL at 10:17

## 2024-12-26 RX ADMIN — DOCUSATE SODIUM 100 MG: 100 CAPSULE, LIQUID FILLED ORAL at 08:53

## 2024-12-26 RX ADMIN — ACETAMINOPHEN 1000 MG: 500 TABLET ORAL at 05:35

## 2024-12-26 RX ADMIN — IBUPROFEN 800 MG: 800 TABLET, FILM COATED ORAL at 02:24

## 2024-12-26 ASSESSMENT — PAIN SCALES - GENERAL
PAINLEVEL_OUTOF10: 5
PAINLEVEL_OUTOF10: 1
PAINLEVEL_OUTOF10: 3
PAINLEVEL_OUTOF10: 0
PAINLEVEL_OUTOF10: 2
PAINLEVEL_OUTOF10: 5

## 2024-12-26 ASSESSMENT — PAIN DESCRIPTION - LOCATION: LOCATION: ABDOMEN

## 2024-12-26 ASSESSMENT — PAIN DESCRIPTION - DESCRIPTORS: DESCRIPTORS: CRAMPING

## 2024-12-26 NOTE — PLAN OF CARE
Problem: Pain  Goal: Verbalizes/displays adequate comfort level or baseline comfort level  2024 0753 by Andrade Flor RN  Outcome: Progressing  2024 075 by Andrade Flor RN  Outcome: Progressing  2024 by Erum Dunn RN  Outcome: Progressing     Problem: Postpartum  Goal: Experiences normal postpartum course  Description:  Postpartum OB-Pregnancy care plan goal which identifies if the mother is experiencing a normal postpartum course  2024 0753 by Andrade Flor RN  Outcome: Progressing  2024 075 by Andrade Flor RN  Outcome: Progressing  2024 by Erum Dunn RN  Outcome: Progressing  Goal: Appropriate maternal -  bonding  Description:  Postpartum OB-Pregnancy care plan goal which identifies if the mother and  are bonding appropriately  2024 0753 by Andrade Flor RN  Outcome: Progressing  2024 075 by Andrade Flor RN  Outcome: Progressing  2024 by Erum Dunn RN  Outcome: Progressing  Goal: Establishment of infant feeding pattern  Description:  Postpartum OB-Pregnancy care plan goal which identifies if the mother is establishing a feeding pattern with their   2024 0753 by Andrade Flor RN  Outcome: Progressing  2024 075 by Andrade Flor RN  Outcome: Progressing  2024 by Erum Dunn RN  Outcome: Progressing     Problem: Infection - Adult  Goal: Absence of infection at discharge  2024 0753 by Andrade Flor RN  Outcome: Progressing  2024 075 by Andrade Flor RN  Outcome: Progressing  2024 by Erum Dunn RN  Outcome: Progressing  Goal: Absence of infection during hospitalization  2024 0753 by Andrade Flor RN  Outcome: Progressing  2024 075 by Andrade Flor RN  Outcome: Progressing  2024 by Erum Dunn RN  Outcome: Progressing  Goal: Absence of fever/infection during anticipated neutropenic period  2024 0753 by Andrade Flor  RN  Outcome: Progressing  12/26/2024 0752 by Andrade Flor RN  Outcome: Progressing  12/25/2024 2343 by rEum Dunn RN  Outcome: Progressing     Problem: Safety - Adult  Goal: Free from fall injury  12/26/2024 0753 by Andrade Flor RN  Outcome: Progressing  12/26/2024 0752 by Andrade Flor RN  Outcome: Progressing  12/25/2024 2343 by Erum Dunn RN  Outcome: Progressing     Problem: Chronic Conditions and Co-morbidities  Goal: Patient's chronic conditions and co-morbidity symptoms are monitored and maintained or improved  12/26/2024 0753 by Andrade Flor RN  Outcome: Progressing  12/26/2024 0752 by Andrade Flor RN  Outcome: Progressing  12/25/2024 2343 by Erum Dunn RN  Outcome: Progressing

## 2024-12-26 NOTE — DISCHARGE INSTRUCTIONS
Breastfeeding support group meets every Thursday at 11:00 AM on maternity    [email or web address] is a good resource about breast feeding

## 2024-12-26 NOTE — LACTATION NOTE
In to visit pt  Mother attempting to awaken infant to nurse  Infant is using bili lights   Infant stimulated awake  Infant to left breast with a nipple shield cradle hold  Demonstrated positioning at the breast and deep latch  Infant latched sucking for a few minutes then needed stimulation to remain awake and nursing  Infant nursed for 15 minutes  Reviewed intake and output of the , the supply and demand of breast feeding  Informed mother about breast feeding support group and [email or web address]  Mother as a breast pump      1300  Infant to right breast without a nipple shield  Infant latched and sucking   3

## 2024-12-26 NOTE — PLAN OF CARE
Problem: Pain  Goal: Verbalizes/displays adequate comfort level or baseline comfort level  2024 0752 by Andrade Flor RN  Outcome: Progressing  2024 by Erum Dunn RN  Outcome: Progressing     Problem: Postpartum  Goal: Experiences normal postpartum course  Description:  Postpartum OB-Pregnancy care plan goal which identifies if the mother is experiencing a normal postpartum course  2024 075 by Andrade Flor RN  Outcome: Progressing  2024 by Erum Dunn RN  Outcome: Progressing  Goal: Appropriate maternal -  bonding  Description:  Postpartum OB-Pregnancy care plan goal which identifies if the mother and  are bonding appropriately  2024 075 by Andrade Flor RN  Outcome: Progressing  2024 by Erum Dunn RN  Outcome: Progressing  Goal: Establishment of infant feeding pattern  Description:  Postpartum OB-Pregnancy care plan goal which identifies if the mother is establishing a feeding pattern with their   2024 0752 by Andrade Flor RN  Outcome: Progressing  2024 by Erum Dnun RN  Outcome: Progressing     Problem: Infection - Adult  Goal: Absence of infection at discharge  2024 0752 by Andrade Flor RN  Outcome: Progressing  2024 by Erum Dunn RN  Outcome: Progressing  Goal: Absence of infection during hospitalization  2024 0752 by Andrade Flor RN  Outcome: Progressing  2024 by Erum Dunn RN  Outcome: Progressing  Goal: Absence of fever/infection during anticipated neutropenic period  2024 0752 by Andrade Flor RN  Outcome: Progressing  2024 by Erum Dunn RN  Outcome: Progressing     Problem: Safety - Adult  Goal: Free from fall injury  2024 075 by Andrade Flor RN  Outcome: Progressing  2024 by Erum Dunn RN  Outcome: Progressing     Problem: Chronic Conditions and Co-morbidities  Goal: Patient's chronic conditions

## 2024-12-26 NOTE — PLAN OF CARE
Problem: Pain  Goal: Verbalizes/displays adequate comfort level or baseline comfort level  2024 by Erum Dunn RN  Outcome: Progressing  2024 0955 by Florencia Saha RN  Outcome: Progressing     Problem: Postpartum  Goal: Experiences normal postpartum course  Description:  Postpartum OB-Pregnancy care plan goal which identifies if the mother is experiencing a normal postpartum course  2024 by Erum Dunn RN  Outcome: Progressing  2024 0955 by Florencia Saha RN  Outcome: Progressing  Goal: Appropriate maternal -  bonding  Description:  Postpartum OB-Pregnancy care plan goal which identifies if the mother and  are bonding appropriately  2024 by Erum Dunn RN  Outcome: Progressing  2024 09 by Florencia Saha RN  Outcome: Progressing  Goal: Establishment of infant feeding pattern  Description:  Postpartum OB-Pregnancy care plan goal which identifies if the mother is establishing a feeding pattern with their   2024 by Erum Dunn RN  Outcome: Progressing  2024 0955 by Florencia Saha RN  Outcome: Progressing     Problem: Infection - Adult  Goal: Absence of infection at discharge  2024 by Erum Dunn RN  Outcome: Progressing  2024 09 by Florencia Saha RN  Outcome: Progressing  Goal: Absence of infection during hospitalization  2024 by Erum Dunn RN  Outcome: Progressing  2024 0955 by Florencia Saha RN  Outcome: Progressing  Goal: Absence of fever/infection during anticipated neutropenic period  2024 by Erum Dunn RN  Outcome: Progressing  2024 09 by Florencia Saha RN  Outcome: Progressing     Problem: Safety - Adult  Goal: Free from fall injury  2024 by Erum Dunn RN  Outcome: Progressing  2024 09 by Florencia Saha RN  Outcome: Progressing     Problem:

## 2024-12-26 NOTE — DISCHARGE SUMMARY
Department of Obstetrics and Gynecology  Labor and Delivery   Vaginal Delivery Post-Partum Discharge Summary      Subjective:     Erum Bermudez is a 24 y.o. female  at 38w5d    Postpartum Day 2: Vaginal Delivery on 24 with a baby girl.    The patient feels well. She denies emotional concerns, her pain is well controlled with current medications.  She is ambulating well and is tolerating a normal diet.     The baby is well and breast feeding well.    Objective:      Patient Vitals for the past 12 hrs:   BP Temp Temp src Pulse Resp SpO2   24 0325 110/62 98.7 °F (37.1 °C) Oral 73 16 99 %   24 2118 112/64 98.3 °F (36.8 °C) Oral 82 16 99 %       General:    alert, cooperative, and no distress   Bowel Sounds:  active   Lochia:  appropriate   Uterine Fundus:   firm with appropriate involution   Perineum: Episiotomy:  None  Laceration:  None   DVT Evaluation:  No evidence of DVT seen on physical exam.     Labs:     Blood Type/Rh:  O NEG       Group B Strep Culture   Date Value Ref Range Status   2024   Final    Rule Out Grp.B Strep:  NEGATIVE FOR GROUP B STREPTOCOCCI  Performed at [facility] [address] [place] [id number] ([phone]       Hemoglobin   Date Value Ref Range Status   2024 8.0 (L) 12.0 - 16.0 g/dL Final   2024 12.5 12.0 - 16.0 g/dL Final     Hematocrit   Date Value Ref Range Status   2024 22.7 (L) 37.0 - 47.0 % Final   2024 34.6 (L) 37.0 - 47.0 % Final     Rubella Antibody IgG   Date Value Ref Range Status   2024 >500.0 IU/mL Final     Comment:     Patient's result indicates immunity.  Default Normal Ranges    >=10 Presumed Immune  <10  Presumed Not immune           Admission on 2024   Component Date Value Ref Range Status    Fetal Screen 2024 NEG   Final    Hep B S Ag Interp 2024 Non-reactive   Final    Total Syphillis IgG/IgM 2024 Non-reactive  Non-reactive Final    WBC 2024 19.4 (H)  4.8 - 10.8 K/uL  Final    RBC 12/25/2024 2.63 (L)  4.20 - 5.40 M/uL Final    Hemoglobin 12/25/2024 8.0 (L)  12.0 - 16.0 g/dL Final    Hematocrit 12/25/2024 22.7 (L)  37.0 - 47.0 % Final    MCV 12/25/2024 86.3  79.4 - 94.8 fL Final    MCH 12/25/2024 30.4  27.0 - 31.3 pg Final    MCHC 12/25/2024 35.2  33.0 - 37.0 % Final    RDW 12/25/2024 13.0  11.5 - 14.5 % Final    Platelets 12/25/2024 224  130 - 400 K/uL Final    PLATELET SLIDE REVIEW 12/25/2024 Adequate   Final    SLIDE REVIEW 12/25/2024 see below   Final    Slide review agrees with reported results    Neutrophils % 12/25/2024 86.0  % Final    Lymphocytes % 12/25/2024 11.0  % Final    Monocytes % 12/25/2024 3.8  % Final    Eosinophils % 12/25/2024 0.0  % Final    Basophils % 12/25/2024 0.1  % Final    Neutrophils Absolute 12/25/2024 16.7 (H)  1.4 - 6.5 K/uL Final    Lymphocytes Absolute 12/25/2024 2.1  1.0 - 4.8 K/uL Final    Monocytes Absolute 12/25/2024 0.8  0.2 - 0.8 K/uL Final    Eosinophils Absolute 12/25/2024 0.0  0.0 - 0.7 K/uL Final    Basophils Absolute 12/25/2024 0.0  0.0 - 0.2 K/uL Final    Smudge Cells 12/25/2024 7.6   Final    Anisocytosis 12/25/2024 1+   Final    Microcytes 12/25/2024 1+   Final    Hypochromia 12/25/2024 1+   Final    Poikilocytes 12/25/2024 1+   Final    ABO/Rh 12/24/2024 O NEG   Final    Antibody Screen 12/24/2024 NEG   Final   Admission on 12/24/2024, Discharged on 12/24/2024   Component Date Value Ref Range Status    Color, UA 12/24/2024 Yellow  Straw/Yellow Final    Clarity, UA 12/24/2024 Clear  Clear Final    Glucose, Ur 12/24/2024 Negative  Negative mg/dL Final    Bilirubin, Urine 12/24/2024 Negative  Negative Final    Ketones, Urine 12/24/2024 40 (A)  Negative mg/dL Final    Specific Gravity, UA 12/24/2024 1.019  1.005 - 1.030 Final    Blood, Urine 12/24/2024 Negative  Negative Final    pH, Urine 12/24/2024 8.5  5.0 - 9.0 Final    Protein, UA 12/24/2024 30 (A)  Negative mg/dL Final    Urobilinogen, Urine 12/24/2024 1.0  <2.0 E.U./dL Final

## 2024-12-27 NOTE — PLAN OF CARE
Problem: Pain  Goal: Verbalizes/displays adequate comfort level or baseline comfort level  2024 by Erum Dunn RN  Outcome: Completed  2024 0753 by Andrade Flor RN  Outcome: Progressing  2024 075 by Andrade Flor RN  Outcome: Progressing     Problem: Postpartum  Goal: Experiences normal postpartum course  Description:  Postpartum OB-Pregnancy care plan goal which identifies if the mother is experiencing a normal postpartum course  2024 by Erum Dunn RN  Outcome: Completed  2024 0753 by Andrade Flor RN  Outcome: Progressing  2024 075 by Andrade Flor RN  Outcome: Progressing  Goal: Appropriate maternal -  bonding  Description:  Postpartum OB-Pregnancy care plan goal which identifies if the mother and  are bonding appropriately  2024 by Erum Dunn RN  Outcome: Completed  2024 0753 by Andrade Flor RN  Outcome: Progressing  2024 075 by Andrade Flor RN  Outcome: Progressing  Goal: Establishment of infant feeding pattern  Description:  Postpartum OB-Pregnancy care plan goal which identifies if the mother is establishing a feeding pattern with their   2024 by Erum Dunn RN  Outcome: Completed  2024 0753 by Andrade Flor RN  Outcome: Progressing  2024 075 by Andrade Flor RN  Outcome: Progressing     Problem: Infection - Adult  Goal: Absence of infection at discharge  2024 by Erum Dunn RN  Outcome: Completed  2024 0753 by Andrade Flor RN  Outcome: Progressing  2024 0752 by Andrade Flor RN  Outcome: Progressing  Goal: Absence of infection during hospitalization  2024 by Erum Dunn RN  Outcome: Completed  2024 0753 by Andrade Flor RN  Outcome: Progressing  2024 075 by Andrade Flor RN  Outcome: Progressing  Goal: Absence of fever/infection during anticipated neutropenic period  2024 by Erum Dunn RN  Outcome:

## 2024-12-27 NOTE — PROGRESS NOTES
Discharge instructions and post birth warning signs reviewed with the patient. Patient verbalized understanding and all questions answered.

## 2024-12-27 NOTE — PROGRESS NOTES
Patient discharged off unit at this time with all belongings, FOB, and infant. Patient in stable condition.

## 2025-01-16 ENCOUNTER — TELEMEDICINE (OUTPATIENT)
Dept: OBGYN CLINIC | Age: 25
End: 2025-01-16

## 2025-01-16 PROBLEM — Z67.91 RH NEGATIVE STATE IN ANTEPARTUM PERIOD, THIRD TRIMESTER: Status: RESOLVED | Noted: 2024-05-11 | Resolved: 2025-01-16

## 2025-01-16 PROBLEM — O26.843 FUNDAL HEIGHT LOW FOR DATES IN THIRD TRIMESTER: Status: RESOLVED | Noted: 2024-11-14 | Resolved: 2025-01-16

## 2025-01-16 PROBLEM — O26.893 RH NEGATIVE STATE IN ANTEPARTUM PERIOD, THIRD TRIMESTER: Status: RESOLVED | Noted: 2024-05-11 | Resolved: 2025-01-16

## 2025-01-16 PROCEDURE — 0503F POSTPARTUM CARE VISIT: CPT | Performed by: ADVANCED PRACTICE MIDWIFE

## 2025-01-16 ASSESSMENT — ENCOUNTER SYMPTOMS
SHORTNESS OF BREATH: 0
CONSTIPATION: 0
DIARRHEA: 0
VOMITING: 0
NAUSEA: 0
ABDOMINAL PAIN: 0

## 2025-01-16 NOTE — PROGRESS NOTES
[address] [place] [address]  Confirm you are appropriately licensed, registered, or certified to deliver care in the state where the patient is located as indicated above. If you are not or unsure, please re-schedule the visit: Yes, I confirm.        --DOMONIQUE Jaime CNM

## 2025-02-05 ENCOUNTER — OFFICE VISIT (OUTPATIENT)
Dept: OBGYN CLINIC | Age: 25
End: 2025-02-05

## 2025-02-05 VITALS
HEART RATE: 86 BPM | HEIGHT: 64 IN | DIASTOLIC BLOOD PRESSURE: 68 MMHG | SYSTOLIC BLOOD PRESSURE: 114 MMHG | BODY MASS INDEX: 24.07 KG/M2 | WEIGHT: 141 LBS

## 2025-02-05 DIAGNOSIS — N94.6 DYSMENORRHEA: ICD-10-CM

## 2025-02-05 DIAGNOSIS — Z30.013 ENCOUNTER FOR INITIAL PRESCRIPTION OF INJECTABLE CONTRACEPTIVE: ICD-10-CM

## 2025-02-05 PROCEDURE — 0503F POSTPARTUM CARE VISIT: CPT | Performed by: ADVANCED PRACTICE MIDWIFE

## 2025-02-05 RX ORDER — MEDROXYPROGESTERONE ACETATE 150 MG/ML
150 INJECTION, SUSPENSION INTRAMUSCULAR
Qty: 1 ML | Refills: 3 | Status: SHIPPED | OUTPATIENT
Start: 2025-02-05 | End: 2026-02-05

## 2025-02-05 ASSESSMENT — ENCOUNTER SYMPTOMS
ABDOMINAL PAIN: 0
DIARRHEA: 0
NAUSEA: 0
SHORTNESS OF BREATH: 0
VOMITING: 0
CONSTIPATION: 0

## 2025-02-05 NOTE — PROGRESS NOTES
SUBJECTIVE:  Erum Bermudez is a 24 y.o. female  who had a Vaginal Delivery on 25 with a baby girl.  She is 6 weeks postpartum.    She feels well, denies emotional concerns.  The baby is well and feeding well.  Postpartum bleeding is Resolved and she is voiding without difficulty.  Perineum is Non-Tender.      Dysmenorrhea  Wishes to start/restart a hormonal contraceptive method to prevent dysmenorrhea symptoms from returning now that she is postpartum.    Dysmenorrhea symptoms have been occurring for greater than 1 year.  Following a discussion of expected changes in menstrual bleeding, possible side effects, non-contraceptive benefits, and the effect on future fertility, wishes to initiate Depo Provera.    Review of Systems   Eyes:  Negative for visual disturbance.   Respiratory:  Negative for shortness of breath.    Gastrointestinal:  Negative for abdominal pain, constipation, diarrhea, nausea and vomiting.   Genitourinary:  Positive for menstrual problem. Negative for dysuria, vaginal bleeding and vaginal discharge.   Neurological:  Negative for headaches.     OBJECTIVE:  Vitals:  /68   Pulse 86   Ht 1.626 m (5' 4\")   Wt 64 kg (141 lb)   Breastfeeding Yes   BMI 24.20 kg/m²     OB Depression Screening:  In the past 7 days:  I have been able to laugh and see the funny side of things: As much as I always could  I have looked forward with enjoyment to things: As much as I ever did  I have blamed myself unnecessarily when things went wrong: Not very often  I have been anxious or worried for no good reason: Hardly ever  I have felt scared or panicky for no good reason: No, not much  I haven't been able to cope lately: No, most of the time I have coped quite well  I have been so unhappy that I have had difficulty sleeping: Not at all  I have felt sad or miserable: Not very often  I have been so unhappy that I have been crying: No, never  The thought of harming myself has occurred to me:

## 2025-02-11 ENCOUNTER — NURSE ONLY (OUTPATIENT)
Dept: OBGYN CLINIC | Age: 25
End: 2025-02-11
Payer: COMMERCIAL

## 2025-02-11 DIAGNOSIS — Z30.013 ENCOUNTER FOR INITIAL PRESCRIPTION OF INJECTABLE CONTRACEPTIVE: ICD-10-CM

## 2025-02-11 LAB
CONTROL: NORMAL
PREGNANCY TEST URINE, POC: NORMAL

## 2025-02-11 PROCEDURE — 81025 URINE PREGNANCY TEST: CPT | Performed by: OBSTETRICS & GYNECOLOGY

## 2025-02-11 PROCEDURE — 96372 THER/PROPH/DIAG INJ SC/IM: CPT | Performed by: OBSTETRICS & GYNECOLOGY

## 2025-02-11 RX ORDER — MEDROXYPROGESTERONE ACETATE 150 MG/ML
150 INJECTION, SUSPENSION INTRAMUSCULAR ONCE
Status: COMPLETED | OUTPATIENT
Start: 2025-02-11 | End: 2025-02-11

## 2025-02-11 RX ADMIN — MEDROXYPROGESTERONE ACETATE 150 MG: 150 INJECTION, SUSPENSION INTRAMUSCULAR at 13:08

## 2025-05-22 ENCOUNTER — CLINICAL SUPPORT (OUTPATIENT)
Dept: OBGYN CLINIC | Age: 25
End: 2025-05-22
Payer: COMMERCIAL

## 2025-05-22 DIAGNOSIS — N94.6 DYSMENORRHEA: Primary | ICD-10-CM

## 2025-05-22 LAB
HCG, URINE, POC: NEGATIVE
Lab: NORMAL
NEGATIVE QC PASS/FAIL: NORMAL
POSITIVE QC PASS/FAIL: NORMAL

## 2025-05-22 PROCEDURE — 96372 THER/PROPH/DIAG INJ SC/IM: CPT | Performed by: ADVANCED PRACTICE MIDWIFE

## 2025-05-22 PROCEDURE — 81025 URINE PREGNANCY TEST: CPT | Performed by: ADVANCED PRACTICE MIDWIFE

## 2025-05-22 RX ORDER — MEDROXYPROGESTERONE ACETATE 150 MG/ML
150 INJECTION, SUSPENSION INTRAMUSCULAR ONCE
Status: COMPLETED | OUTPATIENT
Start: 2025-05-22 | End: 2025-05-22

## 2025-05-22 RX ADMIN — MEDROXYPROGESTERONE ACETATE 150 MG: 150 INJECTION, SUSPENSION INTRAMUSCULAR at 13:48

## 2025-05-22 NOTE — PROGRESS NOTES
Date last pap: 4/1/23.  Last Depo-Provera: 2/11/25.  Side Effects if any: N/A.  Serum HCG indicated? NEGATIVE.  Depo-Provera 150 mg IM given by: ADOLFO GAXIOLA .  Next appointment due In about 11 weeks (on/after 8/7/2025)  .    Cautioned that if she had intercourse in the last two weeks she could be pregnant even though the test shows a negative result. Explained this could have a negative effect on the fetus. States she wants the injection anyway and does not want to wait. Injection of 150 mg IM of Depoprovera given.

## 2025-08-14 ENCOUNTER — CLINICAL SUPPORT (OUTPATIENT)
Dept: OBGYN CLINIC | Age: 25
End: 2025-08-14
Payer: COMMERCIAL

## 2025-08-14 DIAGNOSIS — N94.6 DYSMENORRHEA: Primary | ICD-10-CM

## 2025-08-14 LAB
HCG, URINE, POC: NEGATIVE
Lab: NORMAL
NEGATIVE QC PASS/FAIL: NORMAL
POSITIVE QC PASS/FAIL: NORMAL

## 2025-08-14 PROCEDURE — 81025 URINE PREGNANCY TEST: CPT | Performed by: ADVANCED PRACTICE MIDWIFE

## 2025-08-14 PROCEDURE — 96372 THER/PROPH/DIAG INJ SC/IM: CPT | Performed by: ADVANCED PRACTICE MIDWIFE

## 2025-08-14 RX ORDER — MEDROXYPROGESTERONE ACETATE 150 MG/ML
150 INJECTION, SUSPENSION INTRAMUSCULAR ONCE
Status: COMPLETED | OUTPATIENT
Start: 2025-08-14 | End: 2025-08-14

## 2025-08-14 RX ADMIN — MEDROXYPROGESTERONE ACETATE 150 MG: 150 INJECTION, SUSPENSION INTRAMUSCULAR at 12:27

## 2025-08-28 ASSESSMENT — PATIENT HEALTH QUESTIONNAIRE - PHQ9
SUM OF ALL RESPONSES TO PHQ QUESTIONS 1-9: 3
2. FEELING DOWN, DEPRESSED OR HOPELESS: NOT AT ALL
8. MOVING OR SPEAKING SO SLOWLY THAT OTHER PEOPLE COULD HAVE NOTICED. OR THE OPPOSITE - BEING SO FIDGETY OR RESTLESS THAT YOU HAVE BEEN MOVING AROUND A LOT MORE THAN USUAL: NOT AT ALL
2. FEELING DOWN, DEPRESSED OR HOPELESS: NOT AT ALL
4. FEELING TIRED OR HAVING LITTLE ENERGY: SEVERAL DAYS
5. POOR APPETITE OR OVEREATING: NOT AT ALL
3. TROUBLE FALLING OR STAYING ASLEEP: SEVERAL DAYS
6. FEELING BAD ABOUT YOURSELF - OR THAT YOU ARE A FAILURE OR HAVE LET YOURSELF OR YOUR FAMILY DOWN: NOT AT ALL
1. LITTLE INTEREST OR PLEASURE IN DOING THINGS: NOT AT ALL
SUM OF ALL RESPONSES TO PHQ QUESTIONS 1-9: 3
5. POOR APPETITE OR OVEREATING: NOT AT ALL
SUM OF ALL RESPONSES TO PHQ QUESTIONS 1-9: 3
8. MOVING OR SPEAKING SO SLOWLY THAT OTHER PEOPLE COULD HAVE NOTICED. OR THE OPPOSITE, BEING SO FIGETY OR RESTLESS THAT YOU HAVE BEEN MOVING AROUND A LOT MORE THAN USUAL: NOT AT ALL
SUM OF ALL RESPONSES TO PHQ QUESTIONS 1-9: 3
10. IF YOU CHECKED OFF ANY PROBLEMS, HOW DIFFICULT HAVE THESE PROBLEMS MADE IT FOR YOU TO DO YOUR WORK, TAKE CARE OF THINGS AT HOME, OR GET ALONG WITH OTHER PEOPLE: NOT DIFFICULT AT ALL
10. IF YOU CHECKED OFF ANY PROBLEMS, HOW DIFFICULT HAVE THESE PROBLEMS MADE IT FOR YOU TO DO YOUR WORK, TAKE CARE OF THINGS AT HOME, OR GET ALONG WITH OTHER PEOPLE: NOT DIFFICULT AT ALL
9. THOUGHTS THAT YOU WOULD BE BETTER OFF DEAD, OR OF HURTING YOURSELF: NOT AT ALL
7. TROUBLE CONCENTRATING ON THINGS, SUCH AS READING THE NEWSPAPER OR WATCHING TELEVISION: SEVERAL DAYS
9. THOUGHTS THAT YOU WOULD BE BETTER OFF DEAD, OR OF HURTING YOURSELF: NOT AT ALL
1. LITTLE INTEREST OR PLEASURE IN DOING THINGS: NOT AT ALL
SUM OF ALL RESPONSES TO PHQ QUESTIONS 1-9: 3
4. FEELING TIRED OR HAVING LITTLE ENERGY: SEVERAL DAYS
6. FEELING BAD ABOUT YOURSELF - OR THAT YOU ARE A FAILURE OR HAVE LET YOURSELF OR YOUR FAMILY DOWN: NOT AT ALL
3. TROUBLE FALLING OR STAYING ASLEEP: SEVERAL DAYS
7. TROUBLE CONCENTRATING ON THINGS, SUCH AS READING THE NEWSPAPER OR WATCHING TELEVISION: SEVERAL DAYS

## 2025-08-29 ENCOUNTER — OFFICE VISIT (OUTPATIENT)
Dept: FAMILY MEDICINE CLINIC | Age: 25
End: 2025-08-29
Payer: COMMERCIAL

## 2025-08-29 VITALS
DIASTOLIC BLOOD PRESSURE: 60 MMHG | HEIGHT: 64 IN | BODY MASS INDEX: 22.02 KG/M2 | SYSTOLIC BLOOD PRESSURE: 96 MMHG | WEIGHT: 129 LBS | OXYGEN SATURATION: 99 % | HEART RATE: 97 BPM

## 2025-08-29 DIAGNOSIS — G43.109 MIGRAINE WITH AURA AND WITHOUT STATUS MIGRAINOSUS, NOT INTRACTABLE: Primary | ICD-10-CM

## 2025-08-29 PROCEDURE — G8427 DOCREV CUR MEDS BY ELIG CLIN: HCPCS | Performed by: NURSE PRACTITIONER

## 2025-08-29 PROCEDURE — 99214 OFFICE O/P EST MOD 30 MIN: CPT | Performed by: NURSE PRACTITIONER

## 2025-08-29 PROCEDURE — 1036F TOBACCO NON-USER: CPT | Performed by: NURSE PRACTITIONER

## 2025-08-29 PROCEDURE — G8420 CALC BMI NORM PARAMETERS: HCPCS | Performed by: NURSE PRACTITIONER

## 2025-08-29 RX ORDER — SUMATRIPTAN SUCCINATE 25 MG/1
25 TABLET ORAL DAILY PRN
Qty: 9 TABLET | Refills: 0 | Status: SHIPPED | OUTPATIENT
Start: 2025-08-29

## 2025-08-29 RX ORDER — OMEPRAZOLE 20 MG/1
20 CAPSULE, DELAYED RELEASE ORAL DAILY
COMMUNITY
Start: 2025-06-26

## 2025-08-29 ASSESSMENT — ENCOUNTER SYMPTOMS
EYE PAIN: 0
CONSTIPATION: 0
DIARRHEA: 0
CHEST TIGHTNESS: 0
BACK PAIN: 0
ABDOMINAL PAIN: 0
COLOR CHANGE: 0
SHORTNESS OF BREATH: 0
TROUBLE SWALLOWING: 0
COUGH: 0

## (undated) DEVICE — GLOVE ORTHO 8   MSG9480